# Patient Record
Sex: MALE | Race: WHITE | URBAN - METROPOLITAN AREA
[De-identification: names, ages, dates, MRNs, and addresses within clinical notes are randomized per-mention and may not be internally consistent; named-entity substitution may affect disease eponyms.]

---

## 2017-02-23 ENCOUNTER — TELEPHONE (OUTPATIENT)
Dept: RHEUMATOLOGY | Facility: CLINIC | Age: 57
End: 2017-02-23

## 2017-02-23 RX ORDER — ADALIMUMAB 40MG/0.8ML
KIT SUBCUTANEOUS
Qty: 2 EACH | Refills: 1 | Status: SHIPPED | OUTPATIENT
Start: 2017-02-23 | End: 2017-08-10

## 2017-02-23 NOTE — TELEPHONE ENCOUNTER
LOV 11/18/16  Future Appointments  Date Time Provider Sharif Mai   2/5/5419 4:10 AM Susana Palmer MD [facility] Jari Cooks

## 2017-05-02 ENCOUNTER — TELEPHONE (OUTPATIENT)
Dept: RHEUMATOLOGY | Facility: CLINIC | Age: 57
End: 2017-05-02

## 2017-05-02 RX ORDER — ADALIMUMAB 40MG/0.8ML
KIT SUBCUTANEOUS
Qty: 2 EACH | Refills: 1 | OUTPATIENT
Start: 2017-05-02

## 2017-06-20 ENCOUNTER — PATIENT OUTREACH (OUTPATIENT)
Dept: FAMILY MEDICINE CLINIC | Facility: CLINIC | Age: 57
End: 2017-06-20

## 2017-08-03 ENCOUNTER — TELEPHONE (OUTPATIENT)
Dept: RHEUMATOLOGY | Facility: CLINIC | Age: 57
End: 2017-08-03

## 2017-08-03 DIAGNOSIS — L40.50 PSORIATIC ARTHRITIS (HCC): Primary | ICD-10-CM

## 2017-08-08 LAB
ABSOLUTE BASOPHILS: 32 CELLS/UL (ref 0–200)
ABSOLUTE EOSINOPHILS: 128 CELLS/UL (ref 15–500)
ABSOLUTE LYMPHOCYTES: 2502 CELLS/UL (ref 850–3900)
ABSOLUTE MONOCYTES: 538 CELLS/UL (ref 200–950)
ABSOLUTE NEUTROPHILS: 3200 CELLS/UL (ref 1500–7800)
ALBUMIN/GLOBULIN RATIO: 1.6 (CALC) (ref 1–2.5)
ALBUMIN: 4.3 G/DL (ref 3.6–5.1)
ALKALINE PHOSPHATASE: 53 U/L (ref 40–115)
ALT: 20 U/L (ref 9–46)
AST: 16 U/L (ref 10–35)
BASOPHILS: 0.5 %
BILIRUBIN, TOTAL: 1.3 MG/DL (ref 0.2–1.2)
BUN: 14 MG/DL (ref 7–25)
CALCIUM: 9.1 MG/DL (ref 8.6–10.3)
CARBON DIOXIDE: 26 MMOL/L (ref 20–31)
CHLORIDE: 102 MMOL/L (ref 98–110)
CREATININE: 1.08 MG/DL (ref 0.7–1.33)
EGFR IF AFRICN AM: 87 ML/MIN/1.73M2
EGFR IF NONAFRICN AM: 75 ML/MIN/1.73M2
EOSINOPHILS: 2 %
GLOBULIN: 2.7 G/DL (CALC) (ref 1.9–3.7)
GLUCOSE: 168 MG/DL (ref 65–99)
HEMATOCRIT: 47.7 % (ref 38.5–50)
HEMOGLOBIN: 17 G/DL (ref 13.2–17.1)
LYMPHOCYTES: 39.1 %
MCH: 32.7 PG (ref 27–33)
MCHC: 35.6 G/DL (ref 32–36)
MCV: 91.7 FL (ref 80–100)
MONOCYTES: 8.4 %
MPV: 9.4 FL (ref 7.5–12.5)
NEUTROPHILS: 50 %
PLATELET COUNT: 179 THOUSAND/UL (ref 140–400)
POTASSIUM: 4.3 MMOL/L (ref 3.5–5.3)
PROTEIN, TOTAL: 7 G/DL (ref 6.1–8.1)
RDW: 12.5 % (ref 11–15)
RED BLOOD CELL COUNT: 5.2 MILLION/UL (ref 4.2–5.8)
SED RATE BY MODIFIED$WESTERGREN: 2 MM/H
SODIUM: 137 MMOL/L (ref 135–146)
WHITE BLOOD CELL COUNT: 6.4 THOUSAND/UL (ref 3.8–10.8)

## 2017-08-09 LAB
MITOGEN-NIL: 7.22 IU/ML
NIL: 0.02 IU/ML
QUANTIFERON(R)-TB GOLD: NEGATIVE
TB-NIL: 0.01 IU/ML

## 2017-08-10 ENCOUNTER — OFFICE VISIT (OUTPATIENT)
Dept: RHEUMATOLOGY | Facility: CLINIC | Age: 57
End: 2017-08-10

## 2017-08-10 VITALS
SYSTOLIC BLOOD PRESSURE: 138 MMHG | BODY MASS INDEX: 30.31 KG/M2 | DIASTOLIC BLOOD PRESSURE: 90 MMHG | WEIGHT: 200 LBS | HEIGHT: 68 IN | HEART RATE: 76 BPM | RESPIRATION RATE: 16 BRPM

## 2017-08-10 DIAGNOSIS — L40.50 PSORIATIC ARTHRITIS (HCC): Primary | ICD-10-CM

## 2017-08-10 PROCEDURE — 99214 OFFICE O/P EST MOD 30 MIN: CPT | Performed by: INTERNAL MEDICINE

## 2017-08-10 RX ORDER — FOLIC ACID 1 MG/1
1 TABLET ORAL DAILY
Qty: 90 TABLET | Refills: 3 | Status: SHIPPED | OUTPATIENT
Start: 2017-08-10 | End: 2018-03-12

## 2017-08-10 RX ORDER — ADALIMUMAB 40MG/0.8ML
KIT SUBCUTANEOUS
Qty: 6 EACH | Refills: 2 | Status: SHIPPED | OUTPATIENT
Start: 2017-08-10 | End: 2018-03-12

## 2017-08-10 NOTE — PROGRESS NOTES
[facility]  Dr. Khari Luis Progress Note     Subjective:   Cheryl Castillo is a(n) 59year old male. Current complaints: Patient presents with:  Psoriatic Arthritis: LOV 11/18/16. LABS 8/7/17-sed rate-2. TB test negative. Pt states psoriasis \"not bad\".

## 2017-08-10 NOTE — PATIENT INSTRUCTIONS
Current advice is to continue on yearly Humira 40 mg subcutaneous injection once every 2 weeks. This along with the methotrexate should take care of the psoriatic arthritis. Methotrexate is taken 3 tablets per week, along with folic acid 1 mg a day.   For

## 2017-08-14 ENCOUNTER — TELEPHONE (OUTPATIENT)
Dept: RHEUMATOLOGY | Facility: CLINIC | Age: 57
End: 2017-08-14

## 2017-12-05 NOTE — TELEPHONE ENCOUNTER
LOV 8/10/17  Future Appointments  Date Time Provider Sharif Mai   19/43/2190 05:69 AM Guevara Valiente MD [facility] Annmarie Bowens

## 2017-12-11 ENCOUNTER — OFFICE VISIT (OUTPATIENT)
Dept: RHEUMATOLOGY | Facility: CLINIC | Age: 57
End: 2017-12-11

## 2017-12-11 VITALS
WEIGHT: 199 LBS | SYSTOLIC BLOOD PRESSURE: 146 MMHG | BODY MASS INDEX: 30.16 KG/M2 | RESPIRATION RATE: 16 BRPM | HEIGHT: 68 IN | DIASTOLIC BLOOD PRESSURE: 82 MMHG | HEART RATE: 80 BPM

## 2017-12-11 DIAGNOSIS — R73.01 IFG (IMPAIRED FASTING GLUCOSE): ICD-10-CM

## 2017-12-11 DIAGNOSIS — L40.50 PSORIATIC ARTHRITIS (HCC): Primary | ICD-10-CM

## 2017-12-11 PROCEDURE — 99214 OFFICE O/P EST MOD 30 MIN: CPT | Performed by: INTERNAL MEDICINE

## 2017-12-11 NOTE — PATIENT INSTRUCTIONS
Due to your pain in your right shoulder after lifting items with her son-in-law I recommend he see orthopedic surgeon. Regarding his psoriatic arthritis continue Humira 40 mg subcu every 2 weeks.   Continue methotrexate 3 tablets per week, continue folic a

## 2017-12-11 NOTE — PROGRESS NOTES
[facility]  Dr. Claudio Alvarado Progress Note     Subjective:   Marbella Gonzalez is a(n) 59year old male. Current complaints: Patient presents with:  Psoriatic Arthritis: 3 month f/u.  Pt continues with right shoulder pain-since moved furniture last month has n/ Gloria Alvares MD 40/07/2017 10:13 AM

## 2018-03-09 LAB
ABSOLUTE BASOPHILS: 41 CELLS/UL (ref 0–200)
ABSOLUTE EOSINOPHILS: 151 CELLS/UL (ref 15–500)
ABSOLUTE LYMPHOCYTES: 1914 CELLS/UL (ref 850–3900)
ABSOLUTE MONOCYTES: 719 CELLS/UL (ref 200–950)
ABSOLUTE NEUTROPHILS: 2975 CELLS/UL (ref 1500–7800)
ALBUMIN/GLOBULIN RATIO: 1.7 (CALC) (ref 1–2.5)
ALBUMIN: 4.3 G/DL (ref 3.6–5.1)
ALKALINE PHOSPHATASE: 67 U/L (ref 40–115)
ALT: 20 U/L (ref 9–46)
AST: 16 U/L (ref 10–35)
BASOPHILS: 0.7 %
BILIRUBIN, TOTAL: 1.1 MG/DL (ref 0.2–1.2)
BUN: 15 MG/DL (ref 7–25)
CALCIUM: 9.1 MG/DL (ref 8.6–10.3)
CARBON DIOXIDE: 22 MMOL/L (ref 20–31)
CHLORIDE: 100 MMOL/L (ref 98–110)
CREATININE: 1.01 MG/DL (ref 0.7–1.25)
EGFR IF AFRICN AM: 93 ML/MIN/1.73M2
EGFR IF NONAFRICN AM: 80 ML/MIN/1.73M2
EOSINOPHILS: 2.6 %
GLOBULIN: 2.6 G/DL (CALC) (ref 1.9–3.7)
GLUCOSE: 287 MG/DL (ref 65–99)
HEMATOCRIT: 48.3 % (ref 38.5–50)
HEMOGLOBIN: 17 G/DL (ref 13.2–17.1)
LYMPHOCYTES: 33 %
MCH: 32.8 PG (ref 27–33)
MCHC: 35.2 G/DL (ref 32–36)
MCV: 93.2 FL (ref 80–100)
MONOCYTES: 12.4 %
MPV: 9.6 FL (ref 7.5–12.5)
NEUTROPHILS: 51.3 %
PLATELET COUNT: 169 THOUSAND/UL (ref 140–400)
POTASSIUM: 4.2 MMOL/L (ref 3.5–5.3)
PROTEIN, TOTAL: 6.9 G/DL (ref 6.1–8.1)
RDW: 12 % (ref 11–15)
RED BLOOD CELL COUNT: 5.18 MILLION/UL (ref 4.2–5.8)
SED RATE BY MODIFIED$WESTERGREN: 6 MM/H
SODIUM: 134 MMOL/L (ref 135–146)
WHITE BLOOD CELL COUNT: 5.8 THOUSAND/UL (ref 3.8–10.8)

## 2018-03-12 ENCOUNTER — OFFICE VISIT (OUTPATIENT)
Dept: RHEUMATOLOGY | Facility: CLINIC | Age: 58
End: 2018-03-12

## 2018-03-12 VITALS
BODY MASS INDEX: 29.7 KG/M2 | WEIGHT: 196 LBS | SYSTOLIC BLOOD PRESSURE: 126 MMHG | HEIGHT: 68 IN | DIASTOLIC BLOOD PRESSURE: 80 MMHG | RESPIRATION RATE: 16 BRPM | HEART RATE: 78 BPM

## 2018-03-12 DIAGNOSIS — L40.50 PSORIATIC ARTHRITIS (HCC): Primary | ICD-10-CM

## 2018-03-12 PROCEDURE — 99214 OFFICE O/P EST MOD 30 MIN: CPT | Performed by: INTERNAL MEDICINE

## 2018-03-12 RX ORDER — FOLIC ACID 1 MG/1
1 TABLET ORAL DAILY
Qty: 90 TABLET | Refills: 3 | Status: SHIPPED | OUTPATIENT
Start: 2018-03-12

## 2018-03-12 RX ORDER — ADALIMUMAB 40MG/0.8ML
KIT SUBCUTANEOUS
Qty: 6 EACH | Refills: 3 | Status: SHIPPED | OUTPATIENT
Start: 2018-03-12 | End: 2019-06-18

## 2018-03-12 NOTE — PATIENT INSTRUCTIONS
Continue Humira 40 mg sq every 2 weeks. Use Folic acd 1 mg per week. Use Methotrexate 3 per week  Use ES Tylenol prn. Return to office 4 months with labs.

## 2018-03-12 NOTE — PROGRESS NOTES
[facility]  Dr. Maryanne Lopez Progress Note     Subjective:   Dena Muhammad is a(n) 59year old male. Current complaints: Patient presents with:  Rheumatoid Arthritis: 3 month f/u. Labs 3/8/18-sed rate-6. Pt states no psoriasis or joint pain.  Taking Humira

## 2018-07-12 ENCOUNTER — OFFICE VISIT (OUTPATIENT)
Dept: RHEUMATOLOGY | Facility: CLINIC | Age: 58
End: 2018-07-12

## 2018-07-12 VITALS
HEART RATE: 80 BPM | RESPIRATION RATE: 18 BRPM | BODY MASS INDEX: 29.86 KG/M2 | SYSTOLIC BLOOD PRESSURE: 120 MMHG | WEIGHT: 197 LBS | DIASTOLIC BLOOD PRESSURE: 88 MMHG | HEIGHT: 68 IN

## 2018-07-12 DIAGNOSIS — L40.50 PSORIATIC ARTHRITIS (HCC): Primary | ICD-10-CM

## 2018-07-12 LAB
ABSOLUTE BASOPHILS: 29 CELLS/UL (ref 0–200)
ABSOLUTE EOSINOPHILS: 249 CELLS/UL (ref 15–500)
ABSOLUTE LYMPHOCYTES: 2152 CELLS/UL (ref 850–3900)
ABSOLUTE MONOCYTES: 592 CELLS/UL (ref 200–950)
ABSOLUTE NEUTROPHILS: 2778 CELLS/UL (ref 1500–7800)
ALBUMIN/GLOBULIN RATIO: 1.5 (CALC) (ref 1–2.5)
ALBUMIN: 4 G/DL (ref 3.6–5.1)
ALKALINE PHOSPHATASE: 52 U/L (ref 40–115)
ALT: 16 U/L (ref 9–46)
AST: 14 U/L (ref 10–35)
BASOPHILS: 0.5 %
BILIRUBIN, TOTAL: 1 MG/DL (ref 0.2–1.2)
BUN: 16 MG/DL (ref 7–25)
CALCIUM: 8.8 MG/DL (ref 8.6–10.3)
CARBON DIOXIDE: 25 MMOL/L (ref 20–31)
CHLORIDE: 102 MMOL/L (ref 98–110)
CREATININE: 1.08 MG/DL (ref 0.7–1.25)
EGFR IF AFRICN AM: 86 ML/MIN/1.73M2
EGFR IF NONAFRICN AM: 74 ML/MIN/1.73M2
EOSINOPHILS: 4.3 %
GLOBULIN: 2.7 G/DL (CALC) (ref 1.9–3.7)
GLUCOSE: 155 MG/DL (ref 65–139)
HEMATOCRIT: 47.2 % (ref 38.5–50)
HEMOGLOBIN: 16.4 G/DL (ref 13.2–17.1)
LYMPHOCYTES: 37.1 %
MCH: 32.3 PG (ref 27–33)
MCHC: 34.7 G/DL (ref 32–36)
MCV: 93.1 FL (ref 80–100)
MONOCYTES: 10.2 %
MPV: 9.6 FL (ref 7.5–12.5)
NEUTROPHILS: 47.9 %
PLATELET COUNT: 176 THOUSAND/UL (ref 140–400)
POTASSIUM: 4.3 MMOL/L (ref 3.5–5.3)
PROTEIN, TOTAL: 6.7 G/DL (ref 6.1–8.1)
RDW: 13.1 % (ref 11–15)
RED BLOOD CELL COUNT: 5.07 MILLION/UL (ref 4.2–5.8)
SED RATE BY MODIFIED$WESTERGREN: 2 MM/H
SODIUM: 136 MMOL/L (ref 135–146)
WHITE BLOOD CELL COUNT: 5.8 THOUSAND/UL (ref 3.8–10.8)

## 2018-07-12 PROCEDURE — 99214 OFFICE O/P EST MOD 30 MIN: CPT | Performed by: INTERNAL MEDICINE

## 2018-07-12 NOTE — PROGRESS NOTES
[facility]  Dr. Katelynn Allen Progress Note     Subjective:   Raji Yoon is a(n) 59year old male.    Current complaints: Patient presents with:  Psoriatic Arthritis: 4mo f/u-labs 7/11/18 SED rate 2, RAPID 3 -  mild ,pt c/o swelling in the hands, but feelin

## 2018-07-12 NOTE — PATIENT INSTRUCTIONS
Continue Humira 40 mg every 2 weeks. Use Methotrexate 3 per week. Use Folic acid 1 mg per day. Use ES Tylenol as needed. Return to office 4 months.

## 2018-08-16 ENCOUNTER — TELEPHONE (OUTPATIENT)
Dept: RHEUMATOLOGY | Facility: CLINIC | Age: 58
End: 2018-08-16

## 2018-11-07 ENCOUNTER — OFFICE VISIT (OUTPATIENT)
Dept: RHEUMATOLOGY | Facility: CLINIC | Age: 58
End: 2018-11-07
Payer: COMMERCIAL

## 2018-11-07 VITALS
RESPIRATION RATE: 20 BRPM | BODY MASS INDEX: 29.86 KG/M2 | WEIGHT: 197 LBS | DIASTOLIC BLOOD PRESSURE: 80 MMHG | SYSTOLIC BLOOD PRESSURE: 132 MMHG | HEIGHT: 68 IN | HEART RATE: 78 BPM

## 2018-11-07 DIAGNOSIS — L40.50 PSORIATIC ARTHRITIS (HCC): Primary | ICD-10-CM

## 2018-11-07 DIAGNOSIS — N40.0 BENIGN NON-NODULAR PROSTATIC HYPERPLASIA WITHOUT LOWER URINARY TRACT SYMPTOMS: ICD-10-CM

## 2018-11-07 PROCEDURE — 99214 OFFICE O/P EST MOD 30 MIN: CPT | Performed by: INTERNAL MEDICINE

## 2018-11-07 RX ORDER — CELECOXIB 200 MG/1
200 CAPSULE ORAL 2 TIMES DAILY
Qty: 60 CAPSULE | Refills: 3 | Status: SHIPPED | OUTPATIENT
Start: 2018-11-07 | End: 2019-03-07

## 2018-11-07 NOTE — PATIENT INSTRUCTIONS
Plan of action continue on Humira 40 mg every 2 weeks. Use methotrexate 3 tablets/week along with folic acid 1 mg/day. If pain occurs you can use extra strength Tylenol 500 mg 2 tablets as needed, or if that does not work use Celebrex 200 mg daily.   Retu

## 2019-03-07 ENCOUNTER — OFFICE VISIT (OUTPATIENT)
Dept: RHEUMATOLOGY | Facility: CLINIC | Age: 59
End: 2019-03-07
Payer: COMMERCIAL

## 2019-03-07 VITALS
DIASTOLIC BLOOD PRESSURE: 78 MMHG | RESPIRATION RATE: 16 BRPM | SYSTOLIC BLOOD PRESSURE: 138 MMHG | HEIGHT: 68 IN | BODY MASS INDEX: 28.64 KG/M2 | WEIGHT: 189 LBS | HEART RATE: 78 BPM

## 2019-03-07 DIAGNOSIS — L40.50 PSORIATIC ARTHRITIS (HCC): Primary | ICD-10-CM

## 2019-03-07 PROCEDURE — 99214 OFFICE O/P EST MOD 30 MIN: CPT | Performed by: INTERNAL MEDICINE

## 2019-03-07 NOTE — PROGRESS NOTES
[facility]  Dr. Ruth Ann Singh Progress Note     Subjective:   Wang Carter is a(n) 59year old male. Current complaints: Patient presents with:  Psoriatic Arthritis: 4 month f/u.  Pt getting labs in am.  Pt states missed one dose of Humira due to a cold, go

## 2019-03-07 NOTE — PATIENT INSTRUCTIONS
Use Humira 40 mg every 2 weeks. Use MTX 3 per week. Use Folic acid 1 mg per day. Use ibuprofen for pain as needed or tylenol. D/c Celebrex. Exercise regularly. Return to office in 4 months.

## 2019-03-09 LAB
ABSOLUTE BASOPHILS: 41 CELLS/UL (ref 0–200)
ABSOLUTE EOSINOPHILS: 180 CELLS/UL (ref 15–500)
ABSOLUTE LYMPHOCYTES: 1995 CELLS/UL (ref 850–3900)
ABSOLUTE MONOCYTES: 551 CELLS/UL (ref 200–950)
ABSOLUTE NEUTROPHILS: 3033 CELLS/UL (ref 1500–7800)
ALBUMIN/GLOBULIN RATIO: 1.8 (CALC) (ref 1–2.5)
ALBUMIN: 4.6 G/DL (ref 3.6–5.1)
ALKALINE PHOSPHATASE: 74 U/L (ref 40–115)
ALT: 15 U/L (ref 9–46)
AST: 12 U/L (ref 10–35)
BASOPHILS: 0.7 %
BILIRUBIN, TOTAL: 1.2 MG/DL (ref 0.2–1.2)
BUN: 18 MG/DL (ref 7–25)
CALCIUM: 9.6 MG/DL (ref 8.6–10.3)
CARBON DIOXIDE: 29 MMOL/L (ref 20–32)
CHLORIDE: 98 MMOL/L (ref 98–110)
CREATININE: 0.93 MG/DL (ref 0.7–1.25)
EGFR IF AFRICN AM: 103 ML/MIN/1.73M2
EGFR IF NONAFRICN AM: 89 ML/MIN/1.73M2
EOSINOPHILS: 3.1 %
GLOBULIN: 2.6 G/DL (CALC) (ref 1.9–3.7)
GLUCOSE: 311 MG/DL (ref 65–139)
HEMATOCRIT: 50.7 % (ref 38.5–50)
HEMOGLOBIN: 17.8 G/DL (ref 13.2–17.1)
LYMPHOCYTES: 34.4 %
MCH: 33.1 PG (ref 27–33)
MCHC: 35.1 G/DL (ref 32–36)
MCV: 94.4 FL (ref 80–100)
MONOCYTES: 9.5 %
MPV: 9.7 FL (ref 7.5–12.5)
NEUTROPHILS: 52.3 %
PLATELET COUNT: 169 THOUSAND/UL (ref 140–400)
POTASSIUM: 4.5 MMOL/L (ref 3.5–5.3)
PROTEIN, TOTAL: 7.2 G/DL (ref 6.1–8.1)
PSA, TOTAL: 1.6 NG/ML
RDW: 12.4 % (ref 11–15)
RED BLOOD CELL COUNT: 5.37 MILLION/UL (ref 4.2–5.8)
SED RATE BY MODIFIED$WESTERGREN: 11 MM/H
SODIUM: 134 MMOL/L (ref 135–146)
WHITE BLOOD CELL COUNT: 5.8 THOUSAND/UL (ref 3.8–10.8)

## 2019-03-12 ENCOUNTER — TELEPHONE (OUTPATIENT)
Dept: RHEUMATOLOGY | Facility: CLINIC | Age: 59
End: 2019-03-12

## 2019-03-12 NOTE — TELEPHONE ENCOUNTER
Patient tall called and given test results. Sed rate normal.  Blood count normal.  However random sugar was high at 311. He was told to watch his diet. His PSA was normal at 1.6. Dr. Ruth Ann Singh.

## 2019-04-02 NOTE — TELEPHONE ENCOUNTER
Your appointments     Date & Time Appointment Department [facility])    Jul 12, 2019  9:15 AM CDT Exam - Established Patient with Leongiselle Marcos, [address] ([address] Yohan Richardson)        [address]

## 2019-06-18 ENCOUNTER — TELEPHONE (OUTPATIENT)
Dept: RHEUMATOLOGY | Facility: CLINIC | Age: 59
End: 2019-06-18

## 2019-06-18 NOTE — TELEPHONE ENCOUNTER
LOV 3/7/19  Future Appointments   Date Time Provider Sharif Mai   7/16/7262  0:70 AM Sasha Yen MD [facility] Jewel Bolanos

## 2019-09-20 LAB
ABSOLUTE BASOPHILS: 39 CELLS/UL (ref 0–200)
ABSOLUTE EOSINOPHILS: 160 CELLS/UL (ref 15–500)
ABSOLUTE LYMPHOCYTES: 2349 CELLS/UL (ref 850–3900)
ABSOLUTE MONOCYTES: 490 CELLS/UL (ref 200–950)
ABSOLUTE NEUTROPHILS: 2464 CELLS/UL (ref 1500–7800)
ALBUMIN/GLOBULIN RATIO: 1.6 (CALC) (ref 1–2.5)
ALBUMIN: 4 G/DL (ref 3.6–5.1)
ALKALINE PHOSPHATASE: 58 U/L (ref 40–115)
ALT: 16 U/L (ref 9–46)
AST: 13 U/L (ref 10–35)
BASOPHILS: 0.7 %
BILIRUBIN, TOTAL: 1.3 MG/DL (ref 0.2–1.2)
BUN: 17 MG/DL (ref 7–25)
CALCIUM: 9.2 MG/DL (ref 8.6–10.3)
CARBON DIOXIDE: 30 MMOL/L (ref 20–32)
CHLORIDE: 96 MMOL/L (ref 98–110)
CREATININE: 1.01 MG/DL (ref 0.7–1.25)
EGFR IF AFRICN AM: 93 ML/MIN/1.73M2
EGFR IF NONAFRICN AM: 80 ML/MIN/1.73M2
EOSINOPHILS: 2.9 %
GLOBULIN: 2.5 G/DL (CALC) (ref 1.9–3.7)
GLUCOSE: 275 MG/DL (ref 65–139)
HEMATOCRIT: 48.6 % (ref 38.5–50)
HEMOGLOBIN: 16.9 G/DL (ref 13.2–17.1)
LYMPHOCYTES: 42.7 %
MCH: 33.1 PG (ref 27–33)
MCHC: 34.8 G/DL (ref 32–36)
MCV: 95.1 FL (ref 80–100)
MONOCYTES: 8.9 %
MPV: 9.5 FL (ref 7.5–12.5)
NEUTROPHILS: 44.8 %
PLATELET COUNT: 172 THOUSAND/UL (ref 140–400)
POTASSIUM: 4.2 MMOL/L (ref 3.5–5.3)
PROTEIN, TOTAL: 6.5 G/DL (ref 6.1–8.1)
RDW: 12.2 % (ref 11–15)
RED BLOOD CELL COUNT: 5.11 MILLION/UL (ref 4.2–5.8)
SED RATE BY MODIFIED$WESTERGREN: 2 MM/H
SODIUM: 133 MMOL/L (ref 135–146)
WHITE BLOOD CELL COUNT: 5.5 THOUSAND/UL (ref 3.8–10.8)

## 2019-09-23 ENCOUNTER — OFFICE VISIT (OUTPATIENT)
Dept: RHEUMATOLOGY | Facility: CLINIC | Age: 59
End: 2019-09-23
Payer: COMMERCIAL

## 2019-09-23 VITALS
BODY MASS INDEX: 27.28 KG/M2 | RESPIRATION RATE: 18 BRPM | WEIGHT: 180 LBS | HEART RATE: 86 BPM | DIASTOLIC BLOOD PRESSURE: 78 MMHG | SYSTOLIC BLOOD PRESSURE: 128 MMHG | HEIGHT: 68 IN

## 2019-09-23 DIAGNOSIS — E11.9 TYPE 2 DIABETES MELLITUS WITHOUT COMPLICATION, WITHOUT LONG-TERM CURRENT USE OF INSULIN (HCC): ICD-10-CM

## 2019-09-23 DIAGNOSIS — L40.50 PSORIATIC ARTHRITIS (HCC): Primary | ICD-10-CM

## 2019-09-23 PROCEDURE — 99214 OFFICE O/P EST MOD 30 MIN: CPT | Performed by: INTERNAL MEDICINE

## 2019-09-23 RX ORDER — ADALIMUMAB 40MG/0.4ML
1 KIT SUBCUTANEOUS
Refills: 2 | COMMUNITY
Start: 2019-08-29 | End: 2019-09-24

## 2019-09-23 NOTE — PATIENT INSTRUCTIONS
Advice continue on Humira 40 mg every 2 weeks for the psoriatic arthritis and control of psoriasis. In addition take methotrexate 3 tablets/week and folic acid 1 mg/day. Use of ibuprofen or Tylenol is as needed.   Continue to exercise regularly and watch

## 2019-09-23 NOTE — PROGRESS NOTES
[facility]  Dr. Aron Kyle Progress Note     Subjective:   Darryle Golder is a(n) 62year old male. Current complaints: Patient presents with:  Psoriatic Arthritis: 6 month f/u, labs 9/19/2019 glucose 275 fasting, will call   Feeling ok.   Joint pain  Not

## 2019-09-24 ENCOUNTER — OFFICE VISIT (OUTPATIENT)
Dept: INTERNAL MEDICINE CLINIC | Facility: CLINIC | Age: 59
End: 2019-09-24
Payer: COMMERCIAL

## 2019-09-24 VITALS
SYSTOLIC BLOOD PRESSURE: 126 MMHG | DIASTOLIC BLOOD PRESSURE: 76 MMHG | HEIGHT: 68.11 IN | HEART RATE: 84 BPM | BODY MASS INDEX: 28.34 KG/M2 | RESPIRATION RATE: 16 BRPM | TEMPERATURE: 99 F | WEIGHT: 187 LBS

## 2019-09-24 DIAGNOSIS — E78.5 DYSLIPIDEMIA: ICD-10-CM

## 2019-09-24 DIAGNOSIS — E11.9 TYPE 2 DIABETES MELLITUS WITHOUT COMPLICATION, WITHOUT LONG-TERM CURRENT USE OF INSULIN (HCC): Primary | ICD-10-CM

## 2019-09-24 DIAGNOSIS — E66.3 OVERWEIGHT (BMI 25.0-29.9): ICD-10-CM

## 2019-09-24 PROCEDURE — 99214 OFFICE O/P EST MOD 30 MIN: CPT | Performed by: INTERNAL MEDICINE

## 2019-09-24 RX ORDER — METFORMIN HYDROCHLORIDE 500 MG/1
500 TABLET, EXTENDED RELEASE ORAL 2 TIMES DAILY WITH MEALS
Qty: 180 TABLET | Refills: 0 | Status: SHIPPED | OUTPATIENT
Start: 2019-09-24 | End: 2019-10-04

## 2019-09-24 NOTE — PROGRESS NOTES
Daniela Church  3/9/1960    Patient presents with:  Establish Care  Blood Sugar: recent elevated blood sugar readings, H/O type 2      SUBJECTIVE   Daniela Church is a 62year old male with DM2 who presents to [facility].     The patient has a long-standing h pain   Past Medical History:   Diagnosis Date   • Benign non-nodular prostatic hyperplasia without lower urinary tract symptoms 8/28/2015   • Diabetes type 2, controlled ([facility] [address] 8/28/2015    cured 11.2015   • Obesity (BMI 30-39. 9) 8/28/2015   • Psoriatic art HbA1c    Dyslipidemia:  Fasting lipid panel ordered for baseline  Statin therapy will be initiated (per DM2)    Overweight:  Counseled regarding need for lifestyle changes to include proper diet and regular exercise    Patient was advised follow-up for CPE

## 2019-09-28 LAB
CHOL/HDLC RATIO: 4 (CALC)
CHOLESTEROL, TOTAL: 160 MG/DL
HDL CHOLESTEROL: 40 MG/DL
HEMOGLOBIN A1C: 10.8 % OF TOTAL HGB
LDL-CHOLESTEROL: 100 MG/DL (CALC)
NON-HDL CHOLESTEROL: 120 MG/DL (CALC)
TRIGLYCERIDES: 108 MG/DL

## 2019-09-30 ENCOUNTER — TELEPHONE (OUTPATIENT)
Dept: INTERNAL MEDICINE CLINIC | Facility: CLINIC | Age: 59
End: 2019-09-30

## 2019-09-30 NOTE — TELEPHONE ENCOUNTER
Metformin question on dosage    Pt also needs script sent for test strips for Contour Next meter. Pt only has one left.

## 2019-10-04 ENCOUNTER — OFFICE VISIT (OUTPATIENT)
Dept: INTERNAL MEDICINE CLINIC | Facility: CLINIC | Age: 59
End: 2019-10-04
Payer: COMMERCIAL

## 2019-10-04 VITALS
HEART RATE: 76 BPM | WEIGHT: 189 LBS | RESPIRATION RATE: 16 BRPM | SYSTOLIC BLOOD PRESSURE: 126 MMHG | BODY MASS INDEX: 28.64 KG/M2 | HEIGHT: 68.11 IN | TEMPERATURE: 98 F | DIASTOLIC BLOOD PRESSURE: 80 MMHG

## 2019-10-04 DIAGNOSIS — Z12.11 SCREENING FOR COLON CANCER: ICD-10-CM

## 2019-10-04 DIAGNOSIS — E78.5 DYSLIPIDEMIA: ICD-10-CM

## 2019-10-04 DIAGNOSIS — E08.65 DIABETES MELLITUS DUE TO UNDERLYING CONDITION, UNCONTROLLED, WITH HYPERGLYCEMIA (HCC): Primary | ICD-10-CM

## 2019-10-04 DIAGNOSIS — E66.3 OVERWEIGHT (BMI 25.0-29.9): ICD-10-CM

## 2019-10-04 PROCEDURE — 99214 OFFICE O/P EST MOD 30 MIN: CPT | Performed by: INTERNAL MEDICINE

## 2019-10-04 RX ORDER — ROSUVASTATIN CALCIUM 5 MG/1
5 TABLET, COATED ORAL NIGHTLY
Qty: 90 TABLET | Refills: 0 | Status: SHIPPED | OUTPATIENT
Start: 2019-10-04 | End: 2020-10-29

## 2019-10-04 RX ORDER — METFORMIN HYDROCHLORIDE 500 MG/1
TABLET, EXTENDED RELEASE ORAL
Qty: 180 TABLET | Refills: 0 | Status: SHIPPED | OUTPATIENT
Start: 2019-10-04 | End: 2019-10-15

## 2019-10-04 NOTE — PROGRESS NOTES
Jassi Portillo  3/9/1960    Patient presents with: Follow - Up: Labs 9/27  Vaccinations: defers Flu      Silvia Kendrick is a 62year old male who presents as a follow-up. The patient has a long-standing history of diabetes mellitus type 2.   Docenayoana Palmer mouth daily. Disp: 90 tablet Rfl: 3   Multiple Vitamins-Minerals (MULTIVITAL) Oral Chew Tab Chew 1 tablet by mouth daily.  Disp:  Rfl:         Celebrex [Celecoxib]    OTHER (SEE COMMENTS)    Comment:Leg pain   Past Medical History:   Diagnosis Date   • [facility] is a 62year old male who presents as a follow-up. The patient was advised to increase metformin extended release 500 mg twice daily, to 1 g in the morning and 500 mg at night.   Given the patient's significant improvement in blood glucose levels with an

## 2019-10-14 RX ORDER — METFORMIN HYDROCHLORIDE 500 MG/1
TABLET, EXTENDED RELEASE ORAL
Qty: 180 TABLET | Refills: 0 | OUTPATIENT
Start: 2019-10-14

## 2019-10-15 RX ORDER — METFORMIN HYDROCHLORIDE 500 MG/1
1000 TABLET, EXTENDED RELEASE ORAL 2 TIMES DAILY WITH MEALS
Qty: 360 TABLET | Refills: 0 | Status: SHIPPED | OUTPATIENT
Start: 2019-10-15 | End: 2020-01-16

## 2019-10-15 NOTE — TELEPHONE ENCOUNTER
Metformin 500 mg 2 tabs BID has been pended for approval.     Last Office Visit: 10-4-19 with AD for follow up   Last Rx Filled: 10-4-19 180 tabs with no refills   Last Labs: 9-27-19 hga1c/lipid  Future Appointment: 10-22-19    Per protocol to provider

## 2019-10-15 NOTE — TELEPHONE ENCOUNTER
Pt was advised after 2 weeks of taking this medication with 1 in the morning and 1 in the evening he was supposed to increase this medication to taking 2 in the morning and 2 in the evening.      Pt only has 1 pill left today and needs this medication refil

## 2019-10-17 ENCOUNTER — TELEPHONE (OUTPATIENT)
Dept: RHEUMATOLOGY | Facility: CLINIC | Age: 59
End: 2019-10-17

## 2019-12-06 ENCOUNTER — TELEPHONE (OUTPATIENT)
Dept: RHEUMATOLOGY | Facility: CLINIC | Age: 59
End: 2019-12-06

## 2019-12-06 RX ORDER — METHOTREXATE 2.5 MG/1
TABLET ORAL
Qty: 36 TABLET | Refills: 0 | OUTPATIENT
Start: 2019-12-06

## 2019-12-13 ENCOUNTER — OFFICE VISIT (OUTPATIENT)
Dept: INTERNAL MEDICINE CLINIC | Facility: CLINIC | Age: 59
End: 2019-12-13
Payer: COMMERCIAL

## 2019-12-13 VITALS
TEMPERATURE: 99 F | SYSTOLIC BLOOD PRESSURE: 122 MMHG | WEIGHT: 190 LBS | HEART RATE: 80 BPM | BODY MASS INDEX: 28.46 KG/M2 | RESPIRATION RATE: 16 BRPM | DIASTOLIC BLOOD PRESSURE: 84 MMHG | HEIGHT: 68.31 IN

## 2019-12-13 DIAGNOSIS — E78.5 DYSLIPIDEMIA: ICD-10-CM

## 2019-12-13 DIAGNOSIS — Z12.5 PROSTATE CANCER SCREENING: ICD-10-CM

## 2019-12-13 DIAGNOSIS — L40.50 PSORIATIC ARTHRITIS (HCC): ICD-10-CM

## 2019-12-13 DIAGNOSIS — Z23 FLU VACCINE NEED: ICD-10-CM

## 2019-12-13 DIAGNOSIS — Z00.00 ANNUAL PHYSICAL EXAM: Primary | ICD-10-CM

## 2019-12-13 DIAGNOSIS — E11.9 TYPE 2 DIABETES MELLITUS WITHOUT COMPLICATION, WITHOUT LONG-TERM CURRENT USE OF INSULIN (HCC): ICD-10-CM

## 2019-12-13 DIAGNOSIS — Z12.11 SCREENING FOR COLON CANCER: ICD-10-CM

## 2019-12-13 PROCEDURE — 90471 IMMUNIZATION ADMIN: CPT | Performed by: INTERNAL MEDICINE

## 2019-12-13 PROCEDURE — 90686 IIV4 VACC NO PRSV 0.5 ML IM: CPT | Performed by: INTERNAL MEDICINE

## 2019-12-13 PROCEDURE — 99396 PREV VISIT EST AGE 40-64: CPT | Performed by: INTERNAL MEDICINE

## 2019-12-13 NOTE — PROGRESS NOTES
Colleen Jarrett  3/9/1960    Patient presents with:  Wellness Visit: discuss colon cancer screening option  Vaccinations: requesting Flu      HPI:   Colleen Jarrett is a 62year old male who presents for an annual physical examination.     The patient has been in h Relation Age of Onset   • Diabetes Mother    • Stroke Mother    • Other (Parkinsin [Other]) Father       Social History    Tobacco Use      Smoking status: Never Smoker      Smokeless tobacco: Never Used    Alcohol use: No      Alcohol/week: 0.0 standard d to continue  Repeat lipid panel ordered     The patient indicates understanding of these issues and agrees to the plan.     TODAY'S ORDERS     Orders Placed This Encounter      PSA (Screening) [E]      Occult Blood, Fecal, Immunoassay [E]      Flulaval 6 mo

## 2019-12-23 RX ORDER — PERPHENAZINE 16 MG/1
TABLET, FILM COATED ORAL
Qty: 150 STRIP | Refills: 0 | OUTPATIENT
Start: 2019-12-23

## 2020-01-03 ENCOUNTER — TELEPHONE (OUTPATIENT)
Dept: INTERNAL MEDICINE CLINIC | Facility: CLINIC | Age: 60
End: 2020-01-03

## 2020-01-03 NOTE — TELEPHONE ENCOUNTER
LMTCB. Regarding sx. Informed normally do not prescribe rx over the phone, may need OV. WIC or UC available over the weekend. To call back at the office to further discuss, schedule OV, or if any further questions.

## 2020-01-03 NOTE — TELEPHONE ENCOUNTER
Pt called and stated that he has a sinus infection and with him being on Humira he would like an abx sent in for him     I informed the pt and that he would need to be seen in office but the pt declined stating he doesn't want to get anyone sick by coming

## 2020-01-03 NOTE — TELEPHONE ENCOUNTER
2nd attempt to contact. Lm for pt ([id number] Ana Lieberman per HIPAA) to inform f/u again before end of day. As indicated in previous voicemail may need OV. WIC or UC available over the weekend. To call back at the office if any further questions.   YANETHI

## 2020-01-04 NOTE — TELEPHONE ENCOUNTER
FYI: left message for patient on cell phone at 8:50 am. Recommended IC vs UC evaluation and callback to office with persistent symptoms.

## 2020-01-16 RX ORDER — METFORMIN HYDROCHLORIDE 500 MG/1
1000 TABLET, EXTENDED RELEASE ORAL 2 TIMES DAILY WITH MEALS
Qty: 360 TABLET | Refills: 1 | Status: SHIPPED | OUTPATIENT
Start: 2020-01-16 | End: 2020-08-06

## 2020-01-24 ENCOUNTER — OFFICE VISIT (OUTPATIENT)
Dept: INTERNAL MEDICINE CLINIC | Facility: CLINIC | Age: 60
End: 2020-01-24
Payer: COMMERCIAL

## 2020-01-24 VITALS
RESPIRATION RATE: 16 BRPM | HEIGHT: 68 IN | DIASTOLIC BLOOD PRESSURE: 68 MMHG | BODY MASS INDEX: 29.4 KG/M2 | WEIGHT: 194 LBS | HEART RATE: 88 BPM | TEMPERATURE: 99 F | OXYGEN SATURATION: 97 % | SYSTOLIC BLOOD PRESSURE: 122 MMHG

## 2020-01-24 DIAGNOSIS — K44.9 HIATAL HERNIA: ICD-10-CM

## 2020-01-24 DIAGNOSIS — R09.82 POSTNASAL DRIP: ICD-10-CM

## 2020-01-24 DIAGNOSIS — K21.9 GASTROESOPHAGEAL REFLUX DISEASE, ESOPHAGITIS PRESENCE NOT SPECIFIED: ICD-10-CM

## 2020-01-24 DIAGNOSIS — J32.0 CHRONIC MAXILLARY SINUSITIS: Primary | ICD-10-CM

## 2020-01-24 PROCEDURE — 99214 OFFICE O/P EST MOD 30 MIN: CPT | Performed by: INTERNAL MEDICINE

## 2020-01-24 RX ORDER — METHYLPREDNISOLONE 4 MG/1
TABLET ORAL
Qty: 1 KIT | Refills: 0 | Status: SHIPPED | OUTPATIENT
Start: 2020-01-24 | End: 2020-10-20 | Stop reason: ALTCHOICE

## 2020-01-24 NOTE — PROGRESS NOTES
Darryle Golder  3/9/1960    Patient presents with:  Chest Congestion: since 11/28, productive cough, post nasal drip, nasal congestion, denies fever/chills/aching      SUBJECTIVE   Darryle Golder is a 62year old male who presents with sinus congestion and chron Comment:Leg pain   Past Medical History:   Diagnosis Date   • Benign non-nodular prostatic hyperplasia without lower urinary tract symptoms 8/28/2015   • Diabetes type 2, controlled ([facility] [address] 8/28/2015    cured 11.2015   • Obesity (BMI 30-39. 9) 8/28/2015   • daily pepcid use advised  PND; intranasal glucocorticoids and saline  Chronic right maxillary sinusitis; refer to ENT service  Oral steroid therapy given duration and intensity of symptoms    The patient indicates understanding of these issues and agrees t

## 2020-02-10 ENCOUNTER — OFFICE VISIT (OUTPATIENT)
Dept: RHEUMATOLOGY | Facility: CLINIC | Age: 60
End: 2020-02-10
Payer: COMMERCIAL

## 2020-02-10 VITALS
HEART RATE: 88 BPM | WEIGHT: 193 LBS | RESPIRATION RATE: 18 BRPM | DIASTOLIC BLOOD PRESSURE: 70 MMHG | SYSTOLIC BLOOD PRESSURE: 126 MMHG | BODY MASS INDEX: 29.25 KG/M2 | HEIGHT: 68 IN

## 2020-02-10 DIAGNOSIS — L40.50 PSORIATIC ARTHRITIS (HCC): Primary | ICD-10-CM

## 2020-02-10 DIAGNOSIS — E11.9 TYPE 2 DIABETES MELLITUS WITHOUT COMPLICATION, WITHOUT LONG-TERM CURRENT USE OF INSULIN (HCC): ICD-10-CM

## 2020-02-10 PROCEDURE — 99214 OFFICE O/P EST MOD 30 MIN: CPT | Performed by: INTERNAL MEDICINE

## 2020-02-10 NOTE — PROGRESS NOTES
[facility]  Dr. Aron Kyle Progress Note     Subjective:   Darryle Golder is a(n) 62year old male. Current complaints: Patient presents with:  Psoriatic Arthritis: 5 month f/u, had the flu A&B since last visit, skin and joints good  Feeling not bad now.

## 2020-02-10 NOTE — PATIENT INSTRUCTIONS
Humira 40  mg every 2 weeks. MTX  3 tablets per week. Folic acid 1 mg/day. Return to see Dr. Khari Luis in 4 weeks with labs.

## 2020-03-02 DIAGNOSIS — E11.9 TYPE 2 DIABETES MELLITUS WITHOUT COMPLICATION, WITHOUT LONG-TERM CURRENT USE OF INSULIN (HCC): ICD-10-CM

## 2020-03-02 DIAGNOSIS — L40.50 PSORIATIC ARTHRITIS (HCC): ICD-10-CM

## 2020-03-02 RX ORDER — METHOTREXATE 2.5 MG/1
TABLET ORAL
Qty: 36 TABLET | Refills: 0 | OUTPATIENT
Start: 2020-03-02

## 2020-04-16 RX ORDER — BLOOD SUGAR DIAGNOSTIC
STRIP MISCELLANEOUS
Qty: 200 STRIP | Refills: 2 | Status: SHIPPED | OUTPATIENT
Start: 2020-04-16

## 2020-04-16 NOTE — TELEPHONE ENCOUNTER
Last VISIT 1/24/20 AD    Last REFILL 9/30/19 Contour Next Test 200E 2R    Last LABS 12/28/19 CMP, Lipid, HgA1c, Microalb/Creat    Future Appointments   Date Time Provider Sharif Mai   7/38/9266 66:79 AM Sasha Yen MD [facility] Jewel Bolanos

## 2020-06-12 ENCOUNTER — TELEMEDICINE (OUTPATIENT)
Dept: RHEUMATOLOGY | Facility: CLINIC | Age: 60
End: 2020-06-12

## 2020-06-12 DIAGNOSIS — L40.50 PSORIATIC ARTHRITIS (HCC): Primary | ICD-10-CM

## 2020-06-12 DIAGNOSIS — E11.9 TYPE 2 DIABETES MELLITUS WITHOUT COMPLICATION, WITHOUT LONG-TERM CURRENT USE OF INSULIN (HCC): ICD-10-CM

## 2020-06-12 DIAGNOSIS — L40.9 PSORIASIS: ICD-10-CM

## 2020-06-12 PROCEDURE — 99214 OFFICE O/P EST MOD 30 MIN: CPT | Performed by: INTERNAL MEDICINE

## 2020-06-12 NOTE — PATIENT INSTRUCTIONS
Regarding psoriatic arthritis stay on Humira 40 mg subcu every 2 weeks and methotrexate 3 tablets/week to control his psoriatic arthritis. Continue folic acid 1 mg/day. Follow your diabetic diet. Exercise regularly.   Use over-the-counter ibuprofen as ne

## 2020-06-12 NOTE — PROGRESS NOTES
[facility]  Dr. Gregoria Torres Progress Note     Subjective:   Rosi Hill is a(n) 56year old male. Current complaints: No major complaints. Doing good. Working from home. No major joint pain. Skin is good. No obvious psoriasis. No joint swelling.

## 2020-08-06 RX ORDER — METFORMIN HYDROCHLORIDE 500 MG/1
TABLET, EXTENDED RELEASE ORAL
Qty: 360 TABLET | Refills: 1 | Status: SHIPPED | OUTPATIENT
Start: 2020-08-06 | End: 2021-05-10

## 2020-08-06 NOTE — TELEPHONE ENCOUNTER
Last OV 1.24.20 w/ AD (acute)  Last PE 12.13.19   Last REFILL 1.16.20 Metformin ER 500mg #360 1R  Last LABS 12.28.19 CMP, Lipid, HgA1c, Microalb     Future Appointments   Date Time Provider Sharif Mai   59/74/9152 06:88 PM Prema Ceja MD [facility]

## 2020-09-23 ENCOUNTER — TELEPHONE (OUTPATIENT)
Dept: RHEUMATOLOGY | Facility: CLINIC | Age: 60
End: 2020-09-23

## 2020-09-23 DIAGNOSIS — E11.9 TYPE 2 DIABETES MELLITUS WITHOUT COMPLICATION, WITHOUT LONG-TERM CURRENT USE OF INSULIN (HCC): ICD-10-CM

## 2020-09-23 DIAGNOSIS — L40.50 PSORIATIC ARTHRITIS (HCC): ICD-10-CM

## 2020-09-23 RX ORDER — ADALIMUMAB 40MG/0.4ML
40 KIT SUBCUTANEOUS
Qty: 2 EACH | Refills: 5 | Status: SHIPPED | OUTPATIENT
Start: 2020-09-23 | End: 2021-05-03

## 2020-09-23 NOTE — TELEPHONE ENCOUNTER
Future Appointments   Date Time Provider Sharif Mai   98/90/5992 25:37 PM Livia Deleon MD [facility] Kaykay Barboza

## 2020-10-20 ENCOUNTER — TELEMEDICINE (OUTPATIENT)
Dept: RHEUMATOLOGY | Facility: CLINIC | Age: 60
End: 2020-10-20
Payer: COMMERCIAL

## 2020-10-20 DIAGNOSIS — E11.9 TYPE 2 DIABETES MELLITUS WITHOUT COMPLICATION, WITHOUT LONG-TERM CURRENT USE OF INSULIN (HCC): ICD-10-CM

## 2020-10-20 DIAGNOSIS — L40.50 PSORIATIC ARTHRITIS (HCC): Primary | ICD-10-CM

## 2020-10-20 DIAGNOSIS — L40.9 PSORIASIS: ICD-10-CM

## 2020-10-20 PROCEDURE — 99213 OFFICE O/P EST LOW 20 MIN: CPT | Performed by: INTERNAL MEDICINE

## 2020-10-20 NOTE — PROGRESS NOTES
This visit is conducted using Telemedicine with live, interactive video and audio. Patient has been referred to the [facility] website at [email or web address] to review the yearly Consent to Treat document.     Patient understands and accepts financial res

## 2020-10-20 NOTE — PATIENT INSTRUCTIONS
Need to take Humira 40 mg every 2 weeks for treatment of psoriatic arthritis and psoriasis. Maintain methotrexate 3 tablets weekly which equals 7.5 mg..  Folic acid 1 mg/day. Metformin daily for diabetes. Low-fat low-cholesterol diet.   Ibuprofen over-th

## 2020-10-29 RX ORDER — ROSUVASTATIN CALCIUM 5 MG/1
5 TABLET, COATED ORAL NIGHTLY
Qty: 90 TABLET | Refills: 0 | Status: SHIPPED | OUTPATIENT
Start: 2020-10-29 | End: 2022-04-07

## 2020-10-29 NOTE — TELEPHONE ENCOUNTER
Last VISIT: 01/24/2020    Last REFILL: 10/04/2019 qty 90 tablets/ 0 refills    Last LABS:  10/20/2020 C-REACTIVE PROTEIN, CMP,CBC WITH DIFFERENTIAL    No future appointments. Per PROTOCOL? Passed but last refill was over a year ago. Please Approve or Deny.

## 2020-11-06 NOTE — TELEPHONE ENCOUNTER
Spoke to pt and he stated that he wants to wait until the covid \"calms down\"     He will call to schedule CPE when he feels safer leaving the house.  No immediate issues to be addressed at this time

## 2020-12-07 NOTE — PROGRESS NOTES
[facility]  Dr. Sorto Every Progress Note     Subjective:   Jovan Kaufman is a(n) 59year old male. Current complaints: Patient presents with:  Psoriatic Arthritis: 3 month f/u, labs 11/5 Sed Rate 2, TB neg, on Humira and MTX. Doing well.  Wants to discus - continue toprolol 25 and Entresto 49-51 mg BID, MAPs at goal  - euvolemic off diuretics  - continue ASA 81 mg daily  - continue coumadin for goal INR 2-2.5. - reduce Entresto to 24-26 mg BID to allow for improvements in MAPs. Continue toprolol 25 PO QD. MAP goal 70-80  - euvolemic off diuretics  - continue ASA 81 mg daily  - continue coumadin for goal INR 2-2.5.

## 2021-04-09 ENCOUNTER — TELEPHONE (OUTPATIENT)
Dept: INTERNAL MEDICINE CLINIC | Facility: CLINIC | Age: 61
End: 2021-04-09

## 2021-04-09 DIAGNOSIS — Z13.228 SCREENING FOR METABOLIC DISORDER: ICD-10-CM

## 2021-04-09 DIAGNOSIS — Z13.0 SCREENING FOR BLOOD DISEASE: ICD-10-CM

## 2021-04-09 DIAGNOSIS — Z13.29 SCREENING FOR THYROID DISORDER: ICD-10-CM

## 2021-04-09 DIAGNOSIS — Z13.220 SCREENING FOR LIPID DISORDERS: ICD-10-CM

## 2021-04-09 DIAGNOSIS — E11.9 TYPE 2 DIABETES MELLITUS WITHOUT COMPLICATION, WITHOUT LONG-TERM CURRENT USE OF INSULIN (HCC): ICD-10-CM

## 2021-04-09 DIAGNOSIS — Z00.00 ROUTINE GENERAL MEDICAL EXAMINATION AT A HEALTH CARE FACILITY: Primary | ICD-10-CM

## 2021-04-09 DIAGNOSIS — Z23 NEED FOR VACCINATION: ICD-10-CM

## 2021-04-09 DIAGNOSIS — Z12.5 SCREENING FOR MALIGNANT NEOPLASM OF PROSTATE: ICD-10-CM

## 2021-04-09 NOTE — TELEPHONE ENCOUNTER
Orders to [facility] Pt aware to get labs done no sooner than 2 weeks prior to the appt. Pt aware to fast.  No call back required.   Future Appointments   Date Time Provider Sharif Mai   5/27/2021  8:00 AM Noah Blanc MD [facility]

## 2021-05-03 ENCOUNTER — TELEPHONE (OUTPATIENT)
Dept: RHEUMATOLOGY | Facility: CLINIC | Age: 61
End: 2021-05-03

## 2021-05-03 DIAGNOSIS — E11.9 TYPE 2 DIABETES MELLITUS WITHOUT COMPLICATION, WITHOUT LONG-TERM CURRENT USE OF INSULIN (HCC): ICD-10-CM

## 2021-05-03 DIAGNOSIS — L40.50 PSORIATIC ARTHRITIS (HCC): ICD-10-CM

## 2021-05-03 RX ORDER — ADALIMUMAB 40MG/0.4ML
40 KIT SUBCUTANEOUS
Qty: 2 EACH | Refills: 5 | Status: SHIPPED | OUTPATIENT
Start: 2021-05-03 | End: 2021-05-05

## 2021-05-03 NOTE — TELEPHONE ENCOUNTER
Received via fax RF Humira 40mg/0.4ml Pen  From [place].     LOV 10- virtual  Future Appointments   Date Time Provider Sharif Mai   7/8/7715  1:34 AM Elza Zapien MD [facility] Rajni Bernal

## 2021-05-05 ENCOUNTER — TELEPHONE (OUTPATIENT)
Dept: RHEUMATOLOGY | Facility: CLINIC | Age: 61
End: 2021-05-05

## 2021-05-05 DIAGNOSIS — E11.9 TYPE 2 DIABETES MELLITUS WITHOUT COMPLICATION, WITHOUT LONG-TERM CURRENT USE OF INSULIN (HCC): ICD-10-CM

## 2021-05-05 DIAGNOSIS — L40.50 PSORIATIC ARTHRITIS (HCC): ICD-10-CM

## 2021-05-05 RX ORDER — ADALIMUMAB 40MG/0.4ML
40 KIT SUBCUTANEOUS
Qty: 2 EACH | Refills: 5 | Status: SHIPPED | OUTPATIENT
Start: 2021-05-05 | End: 2021-06-02

## 2021-05-10 RX ORDER — METFORMIN HYDROCHLORIDE 500 MG/1
1000 TABLET, EXTENDED RELEASE ORAL 2 TIMES DAILY WITH MEALS
Qty: 360 TABLET | Refills: 1 | Status: SHIPPED | OUTPATIENT
Start: 2021-05-10 | End: 2022-01-31

## 2021-05-10 NOTE — TELEPHONE ENCOUNTER
Last VISIT 1/24/20 AD GERD    Last REFILL 08/06/20 360T 1R    Last LABS 12/28/19 MA/Creat, HgA1c, Lipid, CMP    Future Appointments   Date Time Provider Sharif Mai   5/27/2021  8:00 AM Tiffanie Noyola MD [facility]   7/0/6519  2:00 AM Michelle

## 2021-05-11 ENCOUNTER — DOCUMENTATION ONLY (OUTPATIENT)
Dept: RHEUMATOLOGY | Facility: CLINIC | Age: 61
End: 2021-05-11

## 2021-05-11 NOTE — PROGRESS NOTES
Insurance information faxed to [facility] for Humira as requested  [phone]; confirmation received.

## 2021-05-12 ENCOUNTER — TELEPHONE (OUTPATIENT)
Dept: RHEUMATOLOGY | Facility: CLINIC | Age: 61
End: 2021-05-12

## 2021-05-12 DIAGNOSIS — L40.50 PSORIATIC ARTHRITIS (HCC): Primary | ICD-10-CM

## 2021-05-12 RX ORDER — ADALIMUMAB 40MG/0.8ML
40 KIT SUBCUTANEOUS
Qty: 2 EACH | Refills: 3 | Status: SHIPPED | OUTPATIENT
Start: 2021-05-12 | End: 2021-10-19

## 2021-06-03 ENCOUNTER — OFFICE VISIT (OUTPATIENT)
Dept: INTERNAL MEDICINE CLINIC | Facility: CLINIC | Age: 61
End: 2021-06-03
Payer: COMMERCIAL

## 2021-06-03 VITALS
WEIGHT: 200 LBS | RESPIRATION RATE: 16 BRPM | TEMPERATURE: 97 F | HEART RATE: 76 BPM | DIASTOLIC BLOOD PRESSURE: 84 MMHG | SYSTOLIC BLOOD PRESSURE: 128 MMHG | OXYGEN SATURATION: 97 % | HEIGHT: 69 IN | BODY MASS INDEX: 29.62 KG/M2

## 2021-06-03 DIAGNOSIS — E11.9 TYPE 2 DIABETES MELLITUS WITHOUT COMPLICATION, WITHOUT LONG-TERM CURRENT USE OF INSULIN (HCC): ICD-10-CM

## 2021-06-03 DIAGNOSIS — Z12.11 SCREEN FOR COLON CANCER: ICD-10-CM

## 2021-06-03 DIAGNOSIS — Z00.00 ROUTINE GENERAL MEDICAL EXAMINATION AT A HEALTH CARE FACILITY: Primary | ICD-10-CM

## 2021-06-03 DIAGNOSIS — K44.9 HIATAL HERNIA: ICD-10-CM

## 2021-06-03 DIAGNOSIS — E78.5 DYSLIPIDEMIA: ICD-10-CM

## 2021-06-03 DIAGNOSIS — L40.50 PSORIATIC ARTHRITIS (HCC): ICD-10-CM

## 2021-06-03 DIAGNOSIS — E80.4 GILBERT'S SYNDROME: ICD-10-CM

## 2021-06-03 PROCEDURE — 99396 PREV VISIT EST AGE 40-64: CPT | Performed by: INTERNAL MEDICINE

## 2021-06-03 PROCEDURE — 3074F SYST BP LT 130 MM HG: CPT | Performed by: INTERNAL MEDICINE

## 2021-06-03 PROCEDURE — 3008F BODY MASS INDEX DOCD: CPT | Performed by: INTERNAL MEDICINE

## 2021-06-03 PROCEDURE — 3079F DIAST BP 80-89 MM HG: CPT | Performed by: INTERNAL MEDICINE

## 2021-06-03 RX ORDER — LISINOPRIL 2.5 MG/1
2.5 TABLET ORAL DAILY
Qty: 90 TABLET | Refills: 1 | Status: SHIPPED | OUTPATIENT
Start: 2021-06-03

## 2021-06-03 NOTE — PROGRESS NOTES
Darryle Golder  3/9/1960    Patient presents with:  Physical: RG rm 7 Physical      HPI:   Darryle Golder is a 59year old male who presents for an annual physical examination. The patient has been in his usual state of health. He denies any acute concerns. • Psoriatic arthritis ([facility] 3/11/2013      Patient Active Problem List:     Psoriatic arthritis ([facility]     Obesity (BMI 30-39. 9)     Benign non-nodular prostatic hyperplasia without lower urinary tract symptoms     Type 2 diabetes mellitus without complic hemoglobin A1c of 6.4%, however this is above the patient's usual blood glucose control attributed to unfavorable changes in dietary and lifestyle management; he will make efforts to improve while maintaining compliance with Metformin 1 g twice daily.   Rep

## 2021-06-21 ENCOUNTER — OFFICE VISIT (OUTPATIENT)
Dept: RHEUMATOLOGY | Facility: CLINIC | Age: 61
End: 2021-06-21
Payer: COMMERCIAL

## 2021-06-21 VITALS
RESPIRATION RATE: 16 BRPM | BODY MASS INDEX: 29.86 KG/M2 | HEART RATE: 80 BPM | WEIGHT: 197 LBS | SYSTOLIC BLOOD PRESSURE: 142 MMHG | TEMPERATURE: 98 F | DIASTOLIC BLOOD PRESSURE: 72 MMHG | HEIGHT: 68 IN

## 2021-06-21 DIAGNOSIS — L40.50 PSORIATIC ARTHRITIS (HCC): Primary | ICD-10-CM

## 2021-06-21 DIAGNOSIS — M21.611 BUNION OF GREAT TOE OF RIGHT FOOT: ICD-10-CM

## 2021-06-21 DIAGNOSIS — L40.9 PSORIASIS: ICD-10-CM

## 2021-06-21 PROCEDURE — 3008F BODY MASS INDEX DOCD: CPT | Performed by: INTERNAL MEDICINE

## 2021-06-21 PROCEDURE — 3077F SYST BP >= 140 MM HG: CPT | Performed by: INTERNAL MEDICINE

## 2021-06-21 PROCEDURE — 3078F DIAST BP <80 MM HG: CPT | Performed by: INTERNAL MEDICINE

## 2021-06-21 PROCEDURE — 99214 OFFICE O/P EST MOD 30 MIN: CPT | Performed by: INTERNAL MEDICINE

## 2021-06-21 PROCEDURE — 20600 DRAIN/INJ JOINT/BURSA W/O US: CPT | Performed by: INTERNAL MEDICINE

## 2021-06-21 RX ORDER — METHYLPREDNISOLONE ACETATE 40 MG/ML
10 INJECTION, SUSPENSION INTRA-ARTICULAR; INTRALESIONAL; INTRAMUSCULAR; SOFT TISSUE ONCE
Status: COMPLETED | OUTPATIENT
Start: 2021-06-21 | End: 2021-06-21

## 2021-06-21 RX ADMIN — METHYLPREDNISOLONE ACETATE 10 MG: 40 INJECTION, SUSPENSION INTRA-ARTICULAR; INTRALESIONAL; INTRAMUSCULAR; SOFT TISSUE at 18:20:00

## 2021-06-21 NOTE — PATIENT INSTRUCTIONS
For your back skin lesions recommend seeing dermatology - [facility] in [place]. Dr. Ramiro Stewart. The back has brown spots that appear to be benign age related changes call seborrheic keratosis.    Right big toe is developing a bunion - part age re

## 2021-06-21 NOTE — PROCEDURES
20600  Diagnosis right big toe bunion. Right big toe osteoarthritis. Procedure injection of right big toe joint first MTP. After obtaining consent for the patient, the right big toe was cleansed with Betadine and alcohol wipes.   Then the right big toe w

## 2021-06-21 NOTE — PROGRESS NOTES
[facility]  Dr. Michael Medrano Progress Note     Subjective:   Sloan Felty is a(n) 59year old male. Current complaints: Patient presents with:  Psoriatic Arthritis: previous video visit. Feeling a bit more achy.  Having a hard time bending big toe on right

## 2021-08-05 ENCOUNTER — TELEPHONE (OUTPATIENT)
Dept: RHEUMATOLOGY | Facility: CLINIC | Age: 61
End: 2021-08-05

## 2021-08-05 DIAGNOSIS — L40.50 PSORIATIC ARTHRITIS (HCC): ICD-10-CM

## 2021-08-05 DIAGNOSIS — E11.9 TYPE 2 DIABETES MELLITUS WITHOUT COMPLICATION, WITHOUT LONG-TERM CURRENT USE OF INSULIN (HCC): ICD-10-CM

## 2021-08-05 NOTE — TELEPHONE ENCOUNTER
LOV 6-21-21  RF Methotrexate 3 tabs/week  Future Appointments   Date Time Provider Sharif Mai   36/42/0983 74:06 AM Juancho Sotelo  W Laura Garsia

## 2021-10-19 ENCOUNTER — OFFICE VISIT (OUTPATIENT)
Dept: RHEUMATOLOGY | Facility: CLINIC | Age: 61
End: 2021-10-19
Payer: COMMERCIAL

## 2021-10-19 VITALS
DIASTOLIC BLOOD PRESSURE: 86 MMHG | OXYGEN SATURATION: 95 % | BODY MASS INDEX: 30.31 KG/M2 | TEMPERATURE: 97 F | HEIGHT: 68 IN | WEIGHT: 200 LBS | HEART RATE: 78 BPM | SYSTOLIC BLOOD PRESSURE: 138 MMHG

## 2021-10-19 DIAGNOSIS — L40.9 PSORIASIS: ICD-10-CM

## 2021-10-19 DIAGNOSIS — L40.50 PSORIATIC ARTHRITIS (HCC): Primary | ICD-10-CM

## 2021-10-19 DIAGNOSIS — E11.9 TYPE 2 DIABETES MELLITUS WITHOUT COMPLICATION, WITHOUT LONG-TERM CURRENT USE OF INSULIN (HCC): ICD-10-CM

## 2021-10-19 PROCEDURE — 3075F SYST BP GE 130 - 139MM HG: CPT | Performed by: INTERNAL MEDICINE

## 2021-10-19 PROCEDURE — 3079F DIAST BP 80-89 MM HG: CPT | Performed by: INTERNAL MEDICINE

## 2021-10-19 PROCEDURE — 3008F BODY MASS INDEX DOCD: CPT | Performed by: INTERNAL MEDICINE

## 2021-10-19 PROCEDURE — 99214 OFFICE O/P EST MOD 30 MIN: CPT | Performed by: INTERNAL MEDICINE

## 2021-10-19 RX ORDER — FOLIC ACID 1 MG/1
1 TABLET ORAL DAILY
Qty: 90 TABLET | Refills: 3 | Status: CANCELLED | OUTPATIENT
Start: 2021-10-19

## 2021-10-19 RX ORDER — ADALIMUMAB 40MG/0.8ML
40 KIT SUBCUTANEOUS
Qty: 2 EACH | Refills: 5 | Status: SHIPPED | OUTPATIENT
Start: 2021-10-19 | End: 2021-11-16

## 2021-10-19 NOTE — PATIENT INSTRUCTIONS
Continue Humira 40 mg every 2 weeks. Methotrexate is used 3 tablets/week which equals a dose of 7.5. Folic acid, vitamin 1 mg a day, which helps prevent side effects from methotrexate.   If you have breakthrough pain you can take either extra strength Tyl

## 2021-10-19 NOTE — PROGRESS NOTES
[facility]  Dr. Luisa Villalba Progress Note     Subjective:   Daniela Church is a(n) 59year old male. Current complaints: Patient presents with:   Follow - Up: LOV 6-21-21; complete PX with Dr Leocadia Mcburney recently labs at [address] [place] [address]; feeling pretty good, Right toe impr 10/19/2021 10:33 AM

## 2021-12-29 ENCOUNTER — TELEPHONE (OUTPATIENT)
Dept: INTERNAL MEDICINE CLINIC | Facility: CLINIC | Age: 61
End: 2021-12-29

## 2021-12-29 NOTE — TELEPHONE ENCOUNTER
LOV with AD 6/3/2021. Pt scheduled through [facility] for in office. Move to sooner VV?

## 2021-12-29 NOTE — TELEPHONE ENCOUNTER
Negative covid test on 12/28 but have bad cold cough head congestion , have had this going on 2 wks.       Future Appointments   Date Time Provider Sharif Pau   1/11/2022  4:40 PM Navid Jones MD [facility]     Should patient wait until 1/

## 2021-12-29 NOTE — TELEPHONE ENCOUNTER
Future Appointments   Date Time Provider Sharif Pau   12/30/2021 10:40 AM Alyssa Morrell MD [facility]   9/42/6728 51:64 PM Cristian Johnson MD [facility] Ros[facility] Burden

## 2021-12-30 ENCOUNTER — TELEMEDICINE (OUTPATIENT)
Dept: INTERNAL MEDICINE CLINIC | Facility: CLINIC | Age: 61
End: 2021-12-30
Payer: COMMERCIAL

## 2021-12-30 DIAGNOSIS — Z12.11 SCREEN FOR COLON CANCER: Primary | ICD-10-CM

## 2021-12-30 DIAGNOSIS — L40.50 PSORIATIC ARTHRITIS (HCC): Primary | ICD-10-CM

## 2021-12-30 PROCEDURE — 99213 OFFICE O/P EST LOW 20 MIN: CPT | Performed by: INTERNAL MEDICINE

## 2021-12-30 RX ORDER — AMOXICILLIN AND CLAVULANATE POTASSIUM 875; 125 MG/1; MG/1
1 TABLET, FILM COATED ORAL 2 TIMES DAILY
Qty: 20 TABLET | Refills: 0 | Status: SHIPPED | OUTPATIENT
Start: 2021-12-30 | End: 2022-01-09

## 2021-12-30 RX ORDER — BENZONATATE 100 MG/1
100 CAPSULE ORAL 3 TIMES DAILY PRN
Qty: 21 CAPSULE | Refills: 0 | Status: SHIPPED | OUTPATIENT
Start: 2021-12-30 | End: 2022-01-06

## 2021-12-30 RX ORDER — ADALIMUMAB 40MG/0.4ML
KIT SUBCUTANEOUS
Qty: 2 EACH | Refills: 3 | Status: SHIPPED | OUTPATIENT
Start: 2021-12-30

## 2021-12-30 RX ORDER — CODEINE PHOSPHATE AND GUAIFENESIN 10; 100 MG/5ML; MG/5ML
5 SOLUTION ORAL NIGHTLY PRN
Qty: 35 ML | Refills: 0 | Status: SHIPPED | OUTPATIENT
Start: 2021-12-30 | End: 2022-01-06

## 2021-12-30 NOTE — PROGRESS NOTES
Gerardo Uriarte  3/9/1960    No chief complaint on file. Video encounter    SUBJECTIVE   Gerardo Uriarte is a 59year old male who presents congestion. The patient describes a three-week duration of nasal and sinus congestion with green mucous.  He reports an a cured 11.2015   • Obesity (BMI 30-39. 9) 8/28/2015   • Psoriatic arthritis ([facility] 3/11/2013      Patient Active Problem List:     Psoriatic arthritis ([facility]     Obesity (BMI 30-39. 9)     Benign non-nodular prostatic hyperplasia without lower urinary tract

## 2021-12-30 NOTE — TELEPHONE ENCOUNTER
LOV 10-19-21  Future Appointments   Date Time Provider Sharif Mai   0/19/6216 20:78 PM Jewel Voss MD [facility]

## 2022-01-31 RX ORDER — METFORMIN HYDROCHLORIDE 500 MG/1
TABLET, EXTENDED RELEASE ORAL
Qty: 360 TABLET | Refills: 1 | Status: SHIPPED | OUTPATIENT
Start: 2022-01-31

## 2022-01-31 NOTE — TELEPHONE ENCOUNTER
Last OV 6/3/21  Last PE 6/3/21  Last REFILL   Medication Quantity Refills Start End   metFORMIN HCl  MG Oral Tablet 24 Hr 360 tablet 1 5/10/2021      Last LABS A1c, microalb, psa, tsh, lipid, cmp, cbc 5/18/21    Future Appointments   Date Time Provid

## 2022-03-02 RX ORDER — METFORMIN HYDROCHLORIDE 500 MG/1
TABLET, EXTENDED RELEASE ORAL
Qty: 360 TABLET | Refills: 1 | Status: SHIPPED | OUTPATIENT
Start: 2022-03-02

## 2022-03-02 NOTE — TELEPHONE ENCOUNTER
Last VISIT 06/03/21    Last CPE 06/03/21    Last REFILL 01/31/22 qty 180 w/1 refills    Last LABS 05/18/21 Multiple labs done      No future appointments. Per PROTOCOL? Failed     Please Approve or Deny.

## 2022-03-15 LAB
ABSOLUTE BASOPHILS: 43 CELLS/UL (ref 0–200)
ABSOLUTE EOSINOPHILS: 163 CELLS/UL (ref 15–500)
ABSOLUTE LYMPHOCYTES: 2357 CELLS/UL (ref 850–3900)
ABSOLUTE MONOCYTES: 618 CELLS/UL (ref 200–950)
ABSOLUTE NEUTROPHILS: 3919 CELLS/UL (ref 1500–7800)
ALBUMIN/GLOBULIN RATIO: 1.5 (CALC) (ref 1–2.5)
ALBUMIN: 4.3 G/DL (ref 3.6–5.1)
ALKALINE PHOSPHATASE: 47 U/L (ref 35–144)
ALT: 18 U/L (ref 9–46)
AST: 15 U/L (ref 10–35)
BASOPHILS: 0.6 %
BILIRUBIN, TOTAL: 1 MG/DL (ref 0.2–1.2)
BUN: 12 MG/DL (ref 7–25)
CALCIUM: 9.5 MG/DL (ref 8.6–10.3)
CARBON DIOXIDE: 29 MMOL/L (ref 20–32)
CHLORIDE: 98 MMOL/L (ref 98–110)
CREATININE: 1.02 MG/DL (ref 0.7–1.25)
EGFR IF AFRICN AM: 90 ML/MIN/1.73M2
EGFR IF NONAFRICN AM: 77 ML/MIN/1.73M2
EOSINOPHILS: 2.3 %
GLOBULIN: 2.8 G/DL (CALC) (ref 1.9–3.7)
GLUCOSE: 147 MG/DL (ref 65–99)
HEMATOCRIT: 45.5 % (ref 38.5–50)
HEMOGLOBIN: 15.8 G/DL (ref 13.2–17.1)
LYMPHOCYTES: 33.2 %
MCH: 33.2 PG (ref 27–33)
MCHC: 34.7 G/DL (ref 32–36)
MCV: 95.6 FL (ref 80–100)
MONOCYTES: 8.7 %
MPV: 9.1 FL (ref 7.5–12.5)
NEUTROPHILS: 55.2 %
PLATELET COUNT: 175 THOUSAND/UL (ref 140–400)
POTASSIUM: 4.4 MMOL/L (ref 3.5–5.3)
PROTEIN, TOTAL: 7.1 G/DL (ref 6.1–8.1)
RDW: 13.2 % (ref 11–15)
RED BLOOD CELL COUNT: 4.76 MILLION/UL (ref 4.2–5.8)
SED RATE BY MODIFIED$WESTERGREN: 2 MM/H
SODIUM: 136 MMOL/L (ref 135–146)
WHITE BLOOD CELL COUNT: 7.1 THOUSAND/UL (ref 3.8–10.8)

## 2022-03-23 RX ORDER — BLOOD SUGAR DIAGNOSTIC
STRIP MISCELLANEOUS 2 TIMES DAILY
Refills: 0 | Status: CANCELLED | OUTPATIENT
Start: 2022-03-23

## 2022-03-23 RX ORDER — BLOOD SUGAR DIAGNOSTIC
STRIP MISCELLANEOUS
Qty: 200 STRIP | Refills: 2 | Status: SHIPPED | OUTPATIENT
Start: 2022-03-23

## 2022-04-01 RX ORDER — LISINOPRIL 2.5 MG/1
TABLET ORAL
Qty: 90 TABLET | Refills: 1 | Status: SHIPPED | OUTPATIENT
Start: 2022-04-01

## 2022-04-01 NOTE — TELEPHONE ENCOUNTER
Last VISIT 06/03/21    Last CPE 06/03/21    Last REFILL 06/03/21 qty 90 w/1 refill    Last LABS 03/14/21 CBC, CMP done    No Future Appointments      Per PROTOCOL? Failed       Please Approve or Deny.

## 2022-04-05 ENCOUNTER — TELEPHONE (OUTPATIENT)
Dept: INTERNAL MEDICINE CLINIC | Facility: CLINIC | Age: 62
End: 2022-04-05

## 2022-04-05 NOTE — TELEPHONE ENCOUNTER
FIT ordered at CPE June 2021, not done. Please mail another card to pt and call him to remind him to do ASAP.

## 2022-04-07 RX ORDER — ROSUVASTATIN CALCIUM 5 MG/1
TABLET, COATED ORAL
Qty: 90 TABLET | Refills: 0 | Status: SHIPPED | OUTPATIENT
Start: 2022-04-07

## 2022-04-11 NOTE — TELEPHONE ENCOUNTER
CMA called to discuss w/ pt. Pt now states he would prefer to do Colonoscopy. Asking for referral to be placed. Pt also asking for PSA orders to be placed for mid May for him to complete.

## 2022-05-10 ENCOUNTER — OFFICE VISIT (OUTPATIENT)
Dept: RHEUMATOLOGY | Facility: CLINIC | Age: 62
End: 2022-05-10
Payer: COMMERCIAL

## 2022-05-10 VITALS
DIASTOLIC BLOOD PRESSURE: 68 MMHG | WEIGHT: 199 LBS | TEMPERATURE: 99 F | OXYGEN SATURATION: 95 % | SYSTOLIC BLOOD PRESSURE: 124 MMHG | HEIGHT: 68 IN | BODY MASS INDEX: 30.16 KG/M2 | HEART RATE: 62 BPM

## 2022-05-10 DIAGNOSIS — L40.9 PSORIASIS: ICD-10-CM

## 2022-05-10 DIAGNOSIS — L40.50 PSORIATIC ARTHRITIS (HCC): Primary | ICD-10-CM

## 2022-05-10 DIAGNOSIS — E11.9 TYPE 2 DIABETES MELLITUS WITHOUT COMPLICATION, WITHOUT LONG-TERM CURRENT USE OF INSULIN (HCC): ICD-10-CM

## 2022-05-10 DIAGNOSIS — M21.611 BUNION OF GREAT TOE OF RIGHT FOOT: ICD-10-CM

## 2022-05-10 PROCEDURE — 3008F BODY MASS INDEX DOCD: CPT | Performed by: INTERNAL MEDICINE

## 2022-05-10 PROCEDURE — 3078F DIAST BP <80 MM HG: CPT | Performed by: INTERNAL MEDICINE

## 2022-05-10 PROCEDURE — 99214 OFFICE O/P EST MOD 30 MIN: CPT | Performed by: INTERNAL MEDICINE

## 2022-05-10 PROCEDURE — 3074F SYST BP LT 130 MM HG: CPT | Performed by: INTERNAL MEDICINE

## 2022-05-10 PROCEDURE — 20600 DRAIN/INJ JOINT/BURSA W/O US: CPT | Performed by: INTERNAL MEDICINE

## 2022-05-10 RX ORDER — METHYLPREDNISOLONE ACETATE 40 MG/ML
10 INJECTION, SUSPENSION INTRA-ARTICULAR; INTRALESIONAL; INTRAMUSCULAR; SOFT TISSUE ONCE
Status: COMPLETED | OUTPATIENT
Start: 2022-05-10 | End: 2022-05-10

## 2022-05-10 RX ORDER — FOLIC ACID 1 MG/1
1 TABLET ORAL DAILY
Qty: 90 TABLET | Refills: 3 | Status: CANCELLED | OUTPATIENT
Start: 2022-05-10

## 2022-05-10 RX ADMIN — METHYLPREDNISOLONE ACETATE 10 MG: 40 INJECTION, SUSPENSION INTRA-ARTICULAR; INTRALESIONAL; INTRAMUSCULAR; SOFT TISSUE at 14:36:00

## 2022-05-10 NOTE — PROCEDURES
20600  Right big toe pain. Right big toe bunion. Injection of right big toe joint first MTP. After obtaining consent from the patient, the right  big toe was cleansed with Betadine alcohol wipes. Then the right big toe was injected at the first MTP joint with 10 mg of methylprednisolone and a fourth of a cc of 1% lidocaine. Patient tolerated the right big toe joint injection without incident.

## 2022-05-10 NOTE — PATIENT INSTRUCTIONS
Today the right big toe joint was injected with cortisone. On rest.  Hopefully this will give you some significant relief of pain in that toe joint. Psoriatic arthritis maintain your Humira at 40 mg every 2 weeks. Methotrexate is 3 tablets/week which equals 7.5 mg. Folic acid is 1 mg/day. Also it is advised to take ibuprofen 200 mg tablets, 1 or 2 at a time 3 times a day if needed for breakthrough pain. Current labs are stable. Return to office for recheck 4 months.

## 2022-06-13 ENCOUNTER — TELEPHONE (OUTPATIENT)
Dept: INTERNAL MEDICINE CLINIC | Facility: CLINIC | Age: 62
End: 2022-06-13

## 2022-06-13 ENCOUNTER — PATIENT MESSAGE (OUTPATIENT)
Dept: INTERNAL MEDICINE CLINIC | Facility: CLINIC | Age: 62
End: 2022-06-13

## 2022-06-13 DIAGNOSIS — Z13.0 SCREENING FOR BLOOD DISEASE: ICD-10-CM

## 2022-06-13 DIAGNOSIS — Z13.220 SCREENING FOR LIPID DISORDERS: ICD-10-CM

## 2022-06-13 DIAGNOSIS — Z13.228 SCREENING FOR METABOLIC DISORDER: ICD-10-CM

## 2022-06-13 DIAGNOSIS — E11.9 TYPE 2 DIABETES MELLITUS WITHOUT COMPLICATION, WITHOUT LONG-TERM CURRENT USE OF INSULIN (HCC): ICD-10-CM

## 2022-06-13 DIAGNOSIS — Z00.00 ROUTINE GENERAL MEDICAL EXAMINATION AT A HEALTH CARE FACILITY: Primary | ICD-10-CM

## 2022-06-13 DIAGNOSIS — Z13.29 SCREENING FOR THYROID DISORDER: ICD-10-CM

## 2022-06-13 NOTE — TELEPHONE ENCOUNTER
Future Appointments   Date Time Provider Sharif Mai   7/14/2022  8:00 AM Dragan Barboza MD [facility]     [facility] to [facility]- Pt informed that labs need to be completed no sooner than 2 weeks prior to the appt.  Pt aware to fast-no call back required

## 2022-06-13 NOTE — TELEPHONE ENCOUNTER
From: Dawood Castro  To: Pavel Staton MD  Sent: 6/13/2022 3:31 PM CDT  Subject: Refill     Hi Dr. Denise Green,    Can I please have 90 day refills for Rosuvastatin and Lisinopril sent to [facility], change of pharmacy for savings. By the way I have set up an Annual Physical in the few weeks and a colon screening later July.     Thank Zachary Brooks

## 2022-06-14 RX ORDER — LISINOPRIL 2.5 MG/1
2.5 TABLET ORAL DAILY
Qty: 90 TABLET | Refills: 0 | Status: SHIPPED | OUTPATIENT
Start: 2022-06-14

## 2022-06-14 RX ORDER — ROSUVASTATIN CALCIUM 5 MG/1
5 TABLET, COATED ORAL NIGHTLY
Qty: 90 TABLET | Refills: 0 | Status: SHIPPED | OUTPATIENT
Start: 2022-06-14

## 2022-06-14 NOTE — TELEPHONE ENCOUNTER
Last VISIT 06/03/21    Last CPE 06/03/21    Last REFILL 04/07/22 qty 90 w/0 refills    Last LABS 03/14/22 CBC, CMP done     Future Appointments   Date Time Provider Sharif Mai   7/14/2022  8:00 AM Nathan Bean MD [facility]         Per PROTOCOL? Failed    Please Approve or Deny.

## 2022-06-14 NOTE — TELEPHONE ENCOUNTER
Bloodwork orders placed to [address] [place] [address] lab for upcoming physical per protocol.

## 2022-07-11 PROCEDURE — 3044F HG A1C LEVEL LT 7.0%: CPT | Performed by: INTERNAL MEDICINE

## 2022-07-11 PROCEDURE — 3061F NEG MICROALBUMINURIA REV: CPT | Performed by: INTERNAL MEDICINE

## 2022-07-12 LAB
ABSOLUTE BASOPHILS: 29 CELLS/UL (ref 0–200)
ABSOLUTE EOSINOPHILS: 191 CELLS/UL (ref 15–500)
ABSOLUTE LYMPHOCYTES: 1926 CELLS/UL (ref 850–3900)
ABSOLUTE MONOCYTES: 617 CELLS/UL (ref 200–950)
ABSOLUTE NEUTROPHILS: 2136 CELLS/UL (ref 1500–7800)
ALBUMIN/GLOBULIN RATIO: 1.5 (CALC) (ref 1–2.5)
ALBUMIN: 4.1 G/DL (ref 3.6–5.1)
ALKALINE PHOSPHATASE: 38 U/L (ref 35–144)
ALT: 17 U/L (ref 9–46)
AST: 14 U/L (ref 10–35)
BASOPHILS: 0.6 %
BILIRUBIN, TOTAL: 1.4 MG/DL (ref 0.2–1.2)
BUN: 18 MG/DL (ref 7–25)
CALCIUM: 9.2 MG/DL (ref 8.6–10.3)
CARBON DIOXIDE: 28 MMOL/L (ref 20–32)
CHLORIDE: 101 MMOL/L (ref 98–110)
CHOL/HDLC RATIO: 4.5 (CALC)
CHOLESTEROL, TOTAL: 174 MG/DL
CREATININE, RANDOM URINE: 137 MG/DL (ref 20–320)
CREATININE: 1.09 MG/DL (ref 0.7–1.35)
EGFR: 76 ML/MIN/1.73M2
EOSINOPHILS: 3.9 %
GLOBULIN: 2.7 G/DL (CALC) (ref 1.9–3.7)
GLUCOSE: 122 MG/DL (ref 65–99)
HDL CHOLESTEROL: 39 MG/DL
HEMATOCRIT: 43.8 % (ref 38.5–50)
HEMOGLOBIN A1C: 6.3 % OF TOTAL HGB
HEMOGLOBIN: 15.1 G/DL (ref 13.2–17.1)
LDL-CHOLESTEROL: 115 MG/DL (CALC)
LYMPHOCYTES: 39.3 %
MCH: 32.9 PG (ref 27–33)
MCHC: 34.5 G/DL (ref 32–36)
MCV: 95.4 FL (ref 80–100)
MICROALBUMIN/CREATININE RATIO, RANDOM URINE: 5 MCG/MG CREAT
MICROALBUMIN: 0.7 MG/DL
MONOCYTES: 12.6 %
MPV: 9.5 FL (ref 7.5–12.5)
NEUTROPHILS: 43.6 %
NON-HDL CHOLESTEROL: 135 MG/DL (CALC)
PLATELET COUNT: 169 THOUSAND/UL (ref 140–400)
POTASSIUM: 4.2 MMOL/L (ref 3.5–5.3)
PROTEIN, TOTAL: 6.8 G/DL (ref 6.1–8.1)
RDW: 12.6 % (ref 11–15)
RED BLOOD CELL COUNT: 4.59 MILLION/UL (ref 4.2–5.8)
SODIUM: 136 MMOL/L (ref 135–146)
TRIGLYCERIDES: 94 MG/DL
TSH W/REFLEX TO FT4: 2.58 MIU/L (ref 0.4–4.5)
WHITE BLOOD CELL COUNT: 4.9 THOUSAND/UL (ref 3.8–10.8)

## 2022-07-14 ENCOUNTER — OFFICE VISIT (OUTPATIENT)
Dept: INTERNAL MEDICINE CLINIC | Facility: CLINIC | Age: 62
End: 2022-07-14
Payer: COMMERCIAL

## 2022-07-14 ENCOUNTER — HOSPITAL ENCOUNTER (OUTPATIENT)
Dept: GENERAL RADIOLOGY | Age: 62
Discharge: HOME | End: 2022-07-14
Attending: INTERNAL MEDICINE
Payer: COMMERCIAL

## 2022-07-14 VITALS
OXYGEN SATURATION: 98 % | HEIGHT: 68.7 IN | DIASTOLIC BLOOD PRESSURE: 78 MMHG | BODY MASS INDEX: 29.21 KG/M2 | RESPIRATION RATE: 16 BRPM | TEMPERATURE: 97 F | HEART RATE: 70 BPM | SYSTOLIC BLOOD PRESSURE: 124 MMHG | WEIGHT: 195 LBS

## 2022-07-14 DIAGNOSIS — E80.4 GILBERT'S SYNDROME: ICD-10-CM

## 2022-07-14 DIAGNOSIS — G89.29 CHRONIC FOOT PAIN, RIGHT: ICD-10-CM

## 2022-07-14 DIAGNOSIS — M79.671 CHRONIC FOOT PAIN, RIGHT: ICD-10-CM

## 2022-07-14 DIAGNOSIS — M21.611 BUNION OF GREAT TOE OF RIGHT FOOT: ICD-10-CM

## 2022-07-14 DIAGNOSIS — L40.50 PSORIATIC ARTHRITIS (HCC): ICD-10-CM

## 2022-07-14 DIAGNOSIS — Z00.00 ROUTINE GENERAL MEDICAL EXAMINATION AT A HEALTH CARE FACILITY: Primary | ICD-10-CM

## 2022-07-14 PROCEDURE — 73630 X-RAY EXAM OF FOOT: CPT | Performed by: INTERNAL MEDICINE

## 2022-07-14 PROCEDURE — 3008F BODY MASS INDEX DOCD: CPT | Performed by: INTERNAL MEDICINE

## 2022-07-14 PROCEDURE — 99396 PREV VISIT EST AGE 40-64: CPT | Performed by: INTERNAL MEDICINE

## 2022-07-14 PROCEDURE — 90471 IMMUNIZATION ADMIN: CPT | Performed by: INTERNAL MEDICINE

## 2022-07-14 PROCEDURE — 3078F DIAST BP <80 MM HG: CPT | Performed by: INTERNAL MEDICINE

## 2022-07-14 PROCEDURE — 3074F SYST BP LT 130 MM HG: CPT | Performed by: INTERNAL MEDICINE

## 2022-07-14 PROCEDURE — 90750 HZV VACC RECOMBINANT IM: CPT | Performed by: INTERNAL MEDICINE

## 2022-07-20 PROBLEM — Z12.11 SPECIAL SCREENING FOR MALIGNANT NEOPLASMS, COLON: Status: ACTIVE | Noted: 2022-07-20

## 2022-09-07 LAB
ABSOLUTE BASOPHILS: 32 CELLS/UL (ref 0–200)
ABSOLUTE EOSINOPHILS: 221 CELLS/UL (ref 15–500)
ABSOLUTE LYMPHOCYTES: 2035 CELLS/UL (ref 850–3900)
ABSOLUTE MONOCYTES: 504 CELLS/UL (ref 200–950)
ABSOLUTE NEUTROPHILS: 3509 CELLS/UL (ref 1500–7800)
ALBUMIN/GLOBULIN RATIO: 1.5 (CALC) (ref 1–2.5)
ALBUMIN: 4.2 G/DL (ref 3.6–5.1)
ALKALINE PHOSPHATASE: 53 U/L (ref 35–144)
ALT: 16 U/L (ref 9–46)
AST: 13 U/L (ref 10–35)
BASOPHILS: 0.5 %
BILIRUBIN, TOTAL: 0.9 MG/DL (ref 0.2–1.2)
BUN: 14 MG/DL (ref 7–25)
CALCIUM: 9.4 MG/DL (ref 8.6–10.3)
CARBON DIOXIDE: 29 MMOL/L (ref 20–32)
CHLORIDE: 101 MMOL/L (ref 98–110)
CREATININE: 1.12 MG/DL (ref 0.7–1.35)
EGFR: 73 ML/MIN/1.73M2
EOSINOPHILS: 3.5 %
GLOBULIN: 2.8 G/DL (CALC) (ref 1.9–3.7)
GLUCOSE: 182 MG/DL (ref 65–99)
HEMATOCRIT: 45.9 % (ref 38.5–50)
HEMOGLOBIN: 16 G/DL (ref 13.2–17.1)
LYMPHOCYTES: 32.3 %
MCH: 32.9 PG (ref 27–33)
MCHC: 34.9 G/DL (ref 32–36)
MCV: 94.3 FL (ref 80–100)
MONOCYTES: 8 %
MPV: 9.3 FL (ref 7.5–12.5)
NEUTROPHILS: 55.7 %
PLATELET COUNT: 166 THOUSAND/UL (ref 140–400)
POTASSIUM: 4.6 MMOL/L (ref 3.5–5.3)
PROTEIN, TOTAL: 7 G/DL (ref 6.1–8.1)
RDW: 12.6 % (ref 11–15)
RED BLOOD CELL COUNT: 4.87 MILLION/UL (ref 4.2–5.8)
SED RATE BY MODIFIED$WESTERGREN: 6 MM/H
SODIUM: 137 MMOL/L (ref 135–146)
WHITE BLOOD CELL COUNT: 6.3 THOUSAND/UL (ref 3.8–10.8)

## 2022-09-08 ENCOUNTER — OFFICE VISIT (OUTPATIENT)
Dept: RHEUMATOLOGY | Facility: CLINIC | Age: 62
End: 2022-09-08
Payer: COMMERCIAL

## 2022-09-08 VITALS
WEIGHT: 195 LBS | HEART RATE: 89 BPM | BODY MASS INDEX: 29.55 KG/M2 | OXYGEN SATURATION: 97 % | SYSTOLIC BLOOD PRESSURE: 124 MMHG | HEIGHT: 68 IN | DIASTOLIC BLOOD PRESSURE: 80 MMHG | TEMPERATURE: 98 F

## 2022-09-08 DIAGNOSIS — E11.9 TYPE 2 DIABETES MELLITUS WITHOUT COMPLICATION, WITHOUT LONG-TERM CURRENT USE OF INSULIN (HCC): ICD-10-CM

## 2022-09-08 DIAGNOSIS — G89.29 CHRONIC RIGHT SHOULDER PAIN: ICD-10-CM

## 2022-09-08 DIAGNOSIS — L40.9 PSORIASIS: Primary | ICD-10-CM

## 2022-09-08 DIAGNOSIS — M25.511 CHRONIC RIGHT SHOULDER PAIN: ICD-10-CM

## 2022-09-08 DIAGNOSIS — L40.50 PSORIATIC ARTHRITIS (HCC): ICD-10-CM

## 2022-09-08 PROCEDURE — 99214 OFFICE O/P EST MOD 30 MIN: CPT | Performed by: INTERNAL MEDICINE

## 2022-09-08 PROCEDURE — 3074F SYST BP LT 130 MM HG: CPT | Performed by: INTERNAL MEDICINE

## 2022-09-08 PROCEDURE — 3079F DIAST BP 80-89 MM HG: CPT | Performed by: INTERNAL MEDICINE

## 2022-09-08 PROCEDURE — 3008F BODY MASS INDEX DOCD: CPT | Performed by: INTERNAL MEDICINE

## 2022-09-08 RX ORDER — ADALIMUMAB 40MG/0.4ML
1 KIT SUBCUTANEOUS
Qty: 2 EACH | Refills: 3 | Status: CANCELLED
Start: 2022-09-08

## 2022-09-08 RX ORDER — FOLIC ACID 1 MG/1
1 TABLET ORAL DAILY
Qty: 90 TABLET | Refills: 3 | Status: CANCELLED
Start: 2022-09-08

## 2022-09-08 NOTE — PATIENT INSTRUCTIONS
Trial of PT for right shoulder pain which is most likely due to wear-and-tear arthritis and some rotator cuff issues. Find a physical therapist near your  home and try it out. In the meantime you can take ibuprofen over-the-counter 1 or 2 tablets 3 times a day as needed for the right shoulder pain. You can apply over-the-counter salves like Voltaren gel or Aspercreme 3 times a day to his right shoulder. Putting a heating pad on it and doing mild stretching circular exercises be helpful. For psoriatic arthritis continue Humira every 2 weeks as an injection. Continue methotrexate 3 tablets/week with folic acid 1 mg/day. Both your psoriatic arthritis and your psoriasis are stable. Also your lab tests are stable. Return to office for recheck 4 months.

## 2022-10-14 ENCOUNTER — TELEPHONE (OUTPATIENT)
Dept: INTERNAL MEDICINE CLINIC | Facility: CLINIC | Age: 62
End: 2022-10-14

## 2022-10-14 NOTE — TELEPHONE ENCOUNTER
Pt has scheduled appt for 2nd Shingles inj   Future Appointments   Date Time Provider Sharif Mai   10/18/2022 11:00 AM [facility]

## 2022-10-17 ENCOUNTER — TELEPHONE (OUTPATIENT)
Dept: INTERNAL MEDICINE CLINIC | Facility: CLINIC | Age: 62
End: 2022-10-17

## 2022-10-17 NOTE — TELEPHONE ENCOUNTER
Pt scheduled for 2nd shingles inj   Future Appointments   Date Time Provider Sharif Mai   10/18/2022 11:00 AM [facility]

## 2022-10-18 ENCOUNTER — NURSE ONLY (OUTPATIENT)
Dept: INTERNAL MEDICINE CLINIC | Facility: CLINIC | Age: 62
End: 2022-10-18
Payer: COMMERCIAL

## 2022-10-18 PROCEDURE — 90750 HZV VACC RECOMBINANT IM: CPT | Performed by: INTERNAL MEDICINE

## 2022-10-18 PROCEDURE — 90471 IMMUNIZATION ADMIN: CPT | Performed by: INTERNAL MEDICINE

## 2022-11-01 DIAGNOSIS — L40.50 PSORIATIC ARTHRITIS (HCC): ICD-10-CM

## 2022-11-01 RX ORDER — ADALIMUMAB 40MG/0.4ML
KIT SUBCUTANEOUS
Qty: 2 EACH | Refills: 5 | Status: SHIPPED | OUTPATIENT
Start: 2022-11-01

## 2022-11-01 NOTE — TELEPHONE ENCOUNTER
Future Appointments   Date Time Provider Sharif Pau   0/3/4563  0:97 AM Jose Wyatt MD [facility]     Last office visit: 9/08/2022    Last labs: 9/6/2022

## 2022-12-13 ENCOUNTER — PATIENT MESSAGE (OUTPATIENT)
Dept: INTERNAL MEDICINE CLINIC | Facility: CLINIC | Age: 62
End: 2022-12-13

## 2022-12-16 ENCOUNTER — MED REC SCAN ONLY (OUTPATIENT)
Dept: INTERNAL MEDICINE CLINIC | Facility: CLINIC | Age: 62
End: 2022-12-16

## 2023-02-01 ENCOUNTER — TELEPHONE (OUTPATIENT)
Dept: RHEUMATOLOGY | Facility: CLINIC | Age: 63
End: 2023-02-01

## 2023-02-01 DIAGNOSIS — N40.1 BENIGN PROSTATIC HYPERPLASIA WITH WEAK URINARY STREAM: Primary | ICD-10-CM

## 2023-02-01 DIAGNOSIS — R39.12 BENIGN PROSTATIC HYPERPLASIA WITH WEAK URINARY STREAM: Primary | ICD-10-CM

## 2023-02-04 LAB
ABSOLUTE BASOPHILS: 31 CELLS/UL (ref 0–200)
ABSOLUTE EOSINOPHILS: 205 CELLS/UL (ref 15–500)
ABSOLUTE LYMPHOCYTES: 2145 CELLS/UL (ref 850–3900)
ABSOLUTE MONOCYTES: 676 CELLS/UL (ref 200–950)
ABSOLUTE NEUTROPHILS: 3143 CELLS/UL (ref 1500–7800)
ALBUMIN/GLOBULIN RATIO: 1.5 (CALC) (ref 1–2.5)
ALBUMIN: 4.2 G/DL (ref 3.6–5.1)
ALKALINE PHOSPHATASE: 52 U/L (ref 35–144)
ALT: 16 U/L (ref 9–46)
AST: 12 U/L (ref 10–35)
BASOPHILS: 0.5 %
BILIRUBIN, TOTAL: 1.1 MG/DL (ref 0.2–1.2)
BUN: 16 MG/DL (ref 7–25)
CALCIUM: 9.8 MG/DL (ref 8.6–10.3)
CARBON DIOXIDE: 31 MMOL/L (ref 20–32)
CHLORIDE: 100 MMOL/L (ref 98–110)
CREATININE: 1.06 MG/DL (ref 0.7–1.35)
EGFR: 78 ML/MIN/1.73M2
EOSINOPHILS: 3.3 %
GLOBULIN: 2.8 G/DL (CALC) (ref 1.9–3.7)
GLUCOSE: 166 MG/DL (ref 65–99)
HEMATOCRIT: 49 % (ref 38.5–50)
HEMOGLOBIN: 17 G/DL (ref 13.2–17.1)
LYMPHOCYTES: 34.6 %
MCH: 32.7 PG (ref 27–33)
MCHC: 34.7 G/DL (ref 32–36)
MCV: 94.2 FL (ref 80–100)
MONOCYTES: 10.9 %
MPV: 9.6 FL (ref 7.5–12.5)
NEUTROPHILS: 50.7 %
PLATELET COUNT: 179 THOUSAND/UL (ref 140–400)
POTASSIUM: 4.2 MMOL/L (ref 3.5–5.3)
PROTEIN, TOTAL: 7 G/DL (ref 6.1–8.1)
PSA, TOTAL: 2.43 NG/ML
RDW: 13 % (ref 11–15)
RED BLOOD CELL COUNT: 5.2 MILLION/UL (ref 4.2–5.8)
SED RATE BY MODIFIED$WESTERGREN: 2 MM/H
SODIUM: 138 MMOL/L (ref 135–146)
WHITE BLOOD CELL COUNT: 6.2 THOUSAND/UL (ref 3.8–10.8)

## 2023-02-08 ENCOUNTER — OFFICE VISIT (OUTPATIENT)
Dept: RHEUMATOLOGY | Facility: CLINIC | Age: 63
End: 2023-02-08
Payer: COMMERCIAL

## 2023-02-08 VITALS
OXYGEN SATURATION: 98 % | HEART RATE: 80 BPM | RESPIRATION RATE: 20 BRPM | DIASTOLIC BLOOD PRESSURE: 88 MMHG | BODY MASS INDEX: 30 KG/M2 | SYSTOLIC BLOOD PRESSURE: 130 MMHG | WEIGHT: 196 LBS

## 2023-02-08 DIAGNOSIS — L40.9 PSORIASIS: ICD-10-CM

## 2023-02-08 DIAGNOSIS — L40.50 PSORIATIC ARTHRITIS (HCC): Primary | ICD-10-CM

## 2023-02-08 PROCEDURE — 3079F DIAST BP 80-89 MM HG: CPT | Performed by: INTERNAL MEDICINE

## 2023-02-08 PROCEDURE — 99214 OFFICE O/P EST MOD 30 MIN: CPT | Performed by: INTERNAL MEDICINE

## 2023-02-08 PROCEDURE — 3075F SYST BP GE 130 - 139MM HG: CPT | Performed by: INTERNAL MEDICINE

## 2023-02-08 NOTE — PATIENT INSTRUCTIONS
Humira 40 mg every 2 weeks. Methotrexate 3 tablets per week. Folic acid 1 mg daily. Labs are good except morning glucose 16. Sed rate ok 2. Ibuprofen as needed   Return to often - 4 months.

## 2023-02-18 ENCOUNTER — PATIENT MESSAGE (OUTPATIENT)
Dept: RHEUMATOLOGY | Facility: CLINIC | Age: 63
End: 2023-02-18

## 2023-02-18 DIAGNOSIS — L40.50 PSORIATIC ARTHRITIS (HCC): ICD-10-CM

## 2023-02-18 DIAGNOSIS — E11.9 TYPE 2 DIABETES MELLITUS WITHOUT COMPLICATION, WITHOUT LONG-TERM CURRENT USE OF INSULIN (HCC): ICD-10-CM

## 2023-02-20 NOTE — TELEPHONE ENCOUNTER
From: Gavin Kong  To: Lina Hill MD  Sent: 7/53/7932 9:42 AM CST  Subject: Methotrexate Refill - to [facility] Dr Zeeshan Boone,    Can you please send a refill request for Methotrexate to [place], [place] . It was transferred last week from [address] [place] [address] per your offices request; however, [facility] is saying they need a refill authorization.      Thanks,    Gavin Kong

## 2023-02-20 NOTE — TELEPHONE ENCOUNTER
Future Appointments   Date Time Provider Sharif Mai   5/8/0581  2:74 AM Pam Worthington MD [facility] [place]     LOV 2/8/23  RTO in [place]

## 2023-03-25 ENCOUNTER — TELEPHONE (OUTPATIENT)
Dept: INTERNAL MEDICINE CLINIC | Facility: CLINIC | Age: 63
End: 2023-03-25

## 2023-03-25 DIAGNOSIS — Z13.0 SCREENING FOR DISORDER OF BLOOD AND BLOOD-FORMING ORGANS: ICD-10-CM

## 2023-03-25 DIAGNOSIS — E11.9 TYPE 2 DIABETES MELLITUS WITHOUT COMPLICATION, WITHOUT LONG-TERM CURRENT USE OF INSULIN (HCC): Primary | ICD-10-CM

## 2023-03-25 DIAGNOSIS — Z00.00 ROUTINE GENERAL MEDICAL EXAMINATION AT A HEALTH CARE FACILITY: ICD-10-CM

## 2023-03-25 DIAGNOSIS — Z13.29 SCREENING FOR THYROID DISORDER: ICD-10-CM

## 2023-03-25 DIAGNOSIS — Z13.228 SCREENING FOR METABOLIC DISORDER: ICD-10-CM

## 2023-03-25 DIAGNOSIS — Z13.220 SCREENING FOR LIPID DISORDERS: ICD-10-CM

## 2023-03-25 NOTE — TELEPHONE ENCOUNTER
Orders to  [facility]   Pt aware to get labs done no sooner than 2 weeks prior to the appt. Pt aware to fast.  No call back required.     Future Appointments   Date Time Provider Sharif Pau   6/1/2023  9:00 AM Sean Clinton MD [facility]

## 2023-03-27 RX ORDER — PERPHENAZINE 16 MG/1
1 TABLET, FILM COATED ORAL 2 TIMES DAILY PRN
Qty: 200 STRIP | Refills: 1 | Status: SHIPPED | OUTPATIENT
Start: 2023-03-27 | End: 2023-06-25

## 2023-03-27 RX ORDER — METFORMIN HYDROCHLORIDE 500 MG/1
1000 TABLET, EXTENDED RELEASE ORAL 2 TIMES DAILY WITH MEALS
Qty: 360 TABLET | Refills: 1 | Status: SHIPPED | OUTPATIENT
Start: 2023-03-27

## 2023-04-19 ENCOUNTER — TELEPHONE (OUTPATIENT)
Dept: RHEUMATOLOGY | Facility: CLINIC | Age: 63
End: 2023-04-19

## 2023-04-19 DIAGNOSIS — L40.50 PSORIATIC ARTHRITIS (HCC): ICD-10-CM

## 2023-04-19 RX ORDER — ADALIMUMAB 40MG/0.4ML
1 KIT SUBCUTANEOUS
Qty: 2 EACH | Refills: 2 | Status: SHIPPED | OUTPATIENT
Start: 2023-04-19

## 2023-04-19 NOTE — TELEPHONE ENCOUNTER
Refill request from [facility] Appointments   Date Time Provider Sharif Mai   6/1/2023  9:00 AM Floresita Banda MD [facility]   3/31/1768 77:31 AM Bello Villagran MD [facility] [place]     LOV 2/8/23  RTO in 4mo

## 2023-05-30 ENCOUNTER — TELEPHONE (OUTPATIENT)
Dept: INTERNAL MEDICINE CLINIC | Facility: CLINIC | Age: 63
End: 2023-05-30

## 2023-05-30 NOTE — TELEPHONE ENCOUNTER
Future Appointments   Date Time Provider Sharif Pau   7/21/2023  9:00 AM Dragan Barboza MD [facility]     Informed must fast no call back required.  Orders to [facility]

## 2023-07-18 PROCEDURE — 3044F HG A1C LEVEL LT 7.0%: CPT | Performed by: INTERNAL MEDICINE

## 2023-07-18 PROCEDURE — 3061F NEG MICROALBUMINURIA REV: CPT | Performed by: INTERNAL MEDICINE

## 2023-07-19 LAB
ABSOLUTE BASOPHILS: 43 CELLS/UL (ref 0–200)
ABSOLUTE EOSINOPHILS: 201 CELLS/UL (ref 15–500)
ABSOLUTE LYMPHOCYTES: 2037 CELLS/UL (ref 850–3900)
ABSOLUTE MONOCYTES: 567 CELLS/UL (ref 200–950)
ABSOLUTE NEUTROPHILS: 3251 CELLS/UL (ref 1500–7800)
ALBUMIN/GLOBULIN RATIO: 1.5 (CALC) (ref 1–2.5)
ALBUMIN: 4.3 G/DL (ref 3.6–5.1)
ALKALINE PHOSPHATASE: 43 U/L (ref 35–144)
ALT: 15 U/L (ref 9–46)
AST: 12 U/L (ref 10–35)
BASOPHILS: 0.7 %
BILIRUBIN, TOTAL: 0.8 MG/DL (ref 0.2–1.2)
BUN: 18 MG/DL (ref 7–25)
CALCIUM: 9.4 MG/DL (ref 8.6–10.3)
CARBON DIOXIDE: 27 MMOL/L (ref 20–32)
CHLORIDE: 102 MMOL/L (ref 98–110)
CHOL/HDLC RATIO: 5.1 (CALC)
CHOLESTEROL, TOTAL: 220 MG/DL
CREATININE, RANDOM URINE: 134 MG/DL (ref 20–320)
CREATININE: 1.11 MG/DL (ref 0.7–1.35)
EGFR: 74 ML/MIN/1.73M2
EOSINOPHILS: 3.3 %
GLOBULIN: 2.8 G/DL (CALC) (ref 1.9–3.7)
GLUCOSE: 110 MG/DL (ref 65–99)
HDL CHOLESTEROL: 43 MG/DL
HEMATOCRIT: 45.7 % (ref 38.5–50)
HEMOGLOBIN A1C: 5.4 % OF TOTAL HGB
HEMOGLOBIN: 15.4 G/DL (ref 13.2–17.1)
LDL-CHOLESTEROL: 156 MG/DL (CALC)
LYMPHOCYTES: 33.4 %
MCH: 32.7 PG (ref 27–33)
MCHC: 33.7 G/DL (ref 32–36)
MCV: 97 FL (ref 80–100)
MICROALBUMIN/CREATININE RATIO, RANDOM URINE: 12 MCG/MG CREAT
MICROALBUMIN: 1.6 MG/DL
MONOCYTES: 9.3 %
MPV: 9.5 FL (ref 7.5–12.5)
NEUTROPHILS: 53.3 %
NON-HDL CHOLESTEROL: 177 MG/DL (CALC)
PLATELET COUNT: 168 THOUSAND/UL (ref 140–400)
POTASSIUM: 4.5 MMOL/L (ref 3.5–5.3)
PROTEIN, TOTAL: 7.1 G/DL (ref 6.1–8.1)
RDW: 12.3 % (ref 11–15)
RED BLOOD CELL COUNT: 4.71 MILLION/UL (ref 4.2–5.8)
SED RATE BY MODIFIED$WESTERGREN: 6 MM/H
SODIUM: 138 MMOL/L (ref 135–146)
TRIGLYCERIDES: 100 MG/DL
TSH W/REFLEX TO FT4: 2.34 MIU/L (ref 0.4–4.5)
WHITE BLOOD CELL COUNT: 6.1 THOUSAND/UL (ref 3.8–10.8)

## 2023-07-21 ENCOUNTER — OFFICE VISIT (OUTPATIENT)
Dept: INTERNAL MEDICINE CLINIC | Facility: CLINIC | Age: 63
End: 2023-07-21
Payer: COMMERCIAL

## 2023-07-21 VITALS
DIASTOLIC BLOOD PRESSURE: 80 MMHG | BODY MASS INDEX: 27.25 KG/M2 | OXYGEN SATURATION: 97 % | HEART RATE: 69 BPM | SYSTOLIC BLOOD PRESSURE: 118 MMHG | WEIGHT: 184 LBS | HEIGHT: 68.9 IN

## 2023-07-21 DIAGNOSIS — L40.9 PSORIASIS: ICD-10-CM

## 2023-07-21 DIAGNOSIS — E80.4 GILBERT'S SYNDROME: ICD-10-CM

## 2023-07-21 DIAGNOSIS — E78.00 PURE HYPERCHOLESTEROLEMIA: ICD-10-CM

## 2023-07-21 DIAGNOSIS — M75.82 TENDINITIS OF LEFT ROTATOR CUFF: ICD-10-CM

## 2023-07-21 DIAGNOSIS — Z00.00 ROUTINE GENERAL MEDICAL EXAMINATION AT A HEALTH CARE FACILITY: Primary | ICD-10-CM

## 2023-07-21 DIAGNOSIS — E11.9 TYPE 2 DIABETES MELLITUS WITHOUT COMPLICATION, WITHOUT LONG-TERM CURRENT USE OF INSULIN (HCC): ICD-10-CM

## 2023-07-21 DIAGNOSIS — L40.50 PSORIATIC ARTHRITIS (HCC): ICD-10-CM

## 2023-07-21 PROCEDURE — 3074F SYST BP LT 130 MM HG: CPT | Performed by: INTERNAL MEDICINE

## 2023-07-21 PROCEDURE — 90677 PCV20 VACCINE IM: CPT | Performed by: INTERNAL MEDICINE

## 2023-07-21 PROCEDURE — 3079F DIAST BP 80-89 MM HG: CPT | Performed by: INTERNAL MEDICINE

## 2023-07-21 PROCEDURE — 99397 PER PM REEVAL EST PAT 65+ YR: CPT | Performed by: INTERNAL MEDICINE

## 2023-07-21 PROCEDURE — 3008F BODY MASS INDEX DOCD: CPT | Performed by: INTERNAL MEDICINE

## 2023-07-21 PROCEDURE — 90471 IMMUNIZATION ADMIN: CPT | Performed by: INTERNAL MEDICINE

## 2023-09-21 ENCOUNTER — PATIENT MESSAGE (OUTPATIENT)
Dept: INTERNAL MEDICINE CLINIC | Facility: CLINIC | Age: 63
End: 2023-09-21

## 2023-09-21 NOTE — TELEPHONE ENCOUNTER
From: Jp Hdez  To: Dragan Barboza  Sent: 9/21/2023 7:31 AM CDT  Subject: Covid Vac. Flu Vac    Good Morning,    1) Do you want me to get both Covid and Flu shots this year? (Have had 4 Covids none recently.)  2) When, and if both should I get them at the same time?     Hope all is well,    Jp Hdez

## 2023-09-28 ENCOUNTER — OFFICE VISIT (OUTPATIENT)
Dept: RHEUMATOLOGY | Facility: CLINIC | Age: 63
End: 2023-09-28
Payer: COMMERCIAL

## 2023-09-28 ENCOUNTER — TELEPHONE (OUTPATIENT)
Dept: INTERNAL MEDICINE CLINIC | Facility: CLINIC | Age: 63
End: 2023-09-28

## 2023-09-28 VITALS
TEMPERATURE: 98 F | RESPIRATION RATE: 16 BRPM | BODY MASS INDEX: 28.7 KG/M2 | DIASTOLIC BLOOD PRESSURE: 78 MMHG | SYSTOLIC BLOOD PRESSURE: 146 MMHG | HEIGHT: 68 IN | OXYGEN SATURATION: 93 % | HEART RATE: 88 BPM | WEIGHT: 189.38 LBS

## 2023-09-28 DIAGNOSIS — G89.29 CHRONIC LEFT SHOULDER PAIN: ICD-10-CM

## 2023-09-28 DIAGNOSIS — L40.9 PSORIASIS: ICD-10-CM

## 2023-09-28 DIAGNOSIS — L40.50 PSORIATIC ARTHRITIS (HCC): Primary | ICD-10-CM

## 2023-09-28 DIAGNOSIS — E11.9 TYPE 2 DIABETES MELLITUS WITHOUT COMPLICATION, WITHOUT LONG-TERM CURRENT USE OF INSULIN (HCC): ICD-10-CM

## 2023-09-28 DIAGNOSIS — M25.512 CHRONIC LEFT SHOULDER PAIN: ICD-10-CM

## 2023-09-28 PROCEDURE — 99214 OFFICE O/P EST MOD 30 MIN: CPT | Performed by: INTERNAL MEDICINE

## 2023-09-28 PROCEDURE — 3008F BODY MASS INDEX DOCD: CPT | Performed by: INTERNAL MEDICINE

## 2023-09-28 PROCEDURE — 3078F DIAST BP <80 MM HG: CPT | Performed by: INTERNAL MEDICINE

## 2023-09-28 PROCEDURE — 3077F SYST BP >= 140 MM HG: CPT | Performed by: INTERNAL MEDICINE

## 2023-09-28 NOTE — PATIENT INSTRUCTIONS
Continue methotrexate 3 tablets/week. Folic acid 1 mg/day. Humira injection 40 mg every 2 weeks. When you have your upcoming vaccinations for flu and COVID, skip your methotrexate for 2 weeks. In other words skip it the week of your vaccination and the week following the vaccination. Current labs are stable. Continue therapy for your left frozen shoulder. Return to office for recheck 4 months.

## 2023-11-21 ENCOUNTER — TELEPHONE (OUTPATIENT)
Dept: INTERNAL MEDICINE CLINIC | Facility: CLINIC | Age: 63
End: 2023-11-21

## 2023-12-07 ENCOUNTER — OFFICE VISIT (OUTPATIENT)
Dept: RHEUMATOLOGY | Facility: CLINIC | Age: 63
End: 2023-12-07
Payer: COMMERCIAL

## 2023-12-07 VITALS
WEIGHT: 194 LBS | DIASTOLIC BLOOD PRESSURE: 70 MMHG | TEMPERATURE: 97 F | BODY MASS INDEX: 29.4 KG/M2 | SYSTOLIC BLOOD PRESSURE: 142 MMHG | RESPIRATION RATE: 16 BRPM | OXYGEN SATURATION: 96 % | HEIGHT: 68 IN | HEART RATE: 76 BPM

## 2023-12-07 DIAGNOSIS — L40.50 PSORIATIC ARTHRITIS (HCC): Primary | ICD-10-CM

## 2023-12-07 DIAGNOSIS — M25.512 CHRONIC LEFT SHOULDER PAIN: ICD-10-CM

## 2023-12-07 DIAGNOSIS — G89.29 CHRONIC LEFT SHOULDER PAIN: ICD-10-CM

## 2023-12-07 DIAGNOSIS — M75.02 ADHESIVE CAPSULITIS OF LEFT SHOULDER: ICD-10-CM

## 2023-12-07 RX ORDER — IBUPROFEN 800 MG/1
TABLET ORAL
COMMUNITY
Start: 2023-10-16

## 2023-12-07 RX ORDER — METHYLPREDNISOLONE ACETATE 40 MG/ML
40 INJECTION, SUSPENSION INTRA-ARTICULAR; INTRALESIONAL; INTRAMUSCULAR; SOFT TISSUE ONCE
Status: COMPLETED | OUTPATIENT
Start: 2023-12-07 | End: 2023-12-07

## 2023-12-07 RX ADMIN — METHYLPREDNISOLONE ACETATE 40 MG: 40 INJECTION, SUSPENSION INTRA-ARTICULAR; INTRALESIONAL; INTRAMUSCULAR; SOFT TISSUE at 17:47:00

## 2023-12-07 NOTE — PROCEDURES
[address]807  Left shoulder injection. Left shoulder pain. Adhesive capsulitis left shoulder. After doing a timeout, and identifying anterior left shoulder, left shoulder was cleansed with Hibiclens and alcohol wipes. In the left shoulder was injected with 40 mg of methylprednisolone and 1 cc of 1% lidocaine. Patient tolerated the shoulder injection without incident.

## 2023-12-07 NOTE — PATIENT INSTRUCTIONS
For your psoriatic arthritis continue your Humira 40 mg every 2 weeks. Continue methotrexate 3 tablets/week. Continue folic acid 1 mg a day. Continue to do stretching exercises and range of motion exercises for your left shoulder. Today your left shoulder was injected with cortisone in the hopes of helping to relieve your frozen shoulder problem. Lab tests were ordered due before the end of the year. Return to office for recheck 4 months.

## 2024-01-01 DIAGNOSIS — L40.9 PSORIASIS: ICD-10-CM

## 2024-01-01 DIAGNOSIS — L40.50 PSORIATIC ARTHRITIS (HCC): Primary | ICD-10-CM

## 2024-01-02 NOTE — TELEPHONE ENCOUNTER
LOV:   12/07/2023        Future Appointments   Date Time Provider Department Center   1/29/2024  3:00 PM Benigno Martinez MD [facility] [place]   4/8/2024 10:20 AM Benigno Martinez MD [facility] [place]       LF:   04/19/2023     QTY:   2 each   Refills:   2    LABS:        Component      Latest Ref Rng 7/18/2023   WBC      3.8 - 10.8 Thousand/uL 6.1    RBC      4.20 - 5.80 Million/uL 4.71    Hemoglobin      13.2 - 17.1 g/dL 15.4    Hematocrit      38.5 - 50.0 % 45.7    MCV      80.0 - 100.0 fL 97.0    MCH      27.0 - 33.0 pg 32.7    MCHC      32.0 - 36.0 g/dL 33.7    RDW      11.0 - 15.0 % 12.3    Platelet Count      140 - 400 Thousand/uL 168    MPV      7.5 - 12.5 fL 9.5    Neutrophils Absolute      1,500 - 7,800 cells/uL 3,251    Lymphocytes Absolute      850 - 3,900 cells/uL 2,037    Monocytes Absolute      200 - 950 cells/uL 567    Eosinophils Absolute      15 - 500 cells/uL 201    Basophils Absolute      0 - 200 cells/uL 43    Neutrophils %      % 53.3    Lymphocytes %      % 33.4    Monocytes %      % 9.3    Eosinophils %      % 3.3    Basophils %      % 0.7    Glucose      65 - 99 mg/dL 110 (H)    BUN      7 - 25 mg/dL 18    CREATININE      0.70 - 1.35 mg/dL 1.11    EGFR      > OR = 60 mL/min/1.73m2 74    BUN/CREATININE RATIO      6 - 22 (calc) NOT APPLICABLE    Sodium      135 - 146 mmol/L 138    Potassium      3.5 - 5.3 mmol/L 4.5    Chloride      98 - 110 mmol/L 102    Carbon Dioxide, Total      20 - 32 mmol/L 27    CALCIUM      8.6 - 10.3 mg/dL 9.4    PROTEIN, TOTAL      6.1 - 8.1 g/dL 7.1    Albumin      3.6 - 5.1 g/dL 4.3    Globulin      1.9 - 3.7 g/dL (calc) 2.8    A/G Ratio      1.0 - 2.5 (calc) 1.5    Total Bilirubin      0.2 - 1.2 mg/dL 0.8    Alkaline Phosphatase      35 - 144 U/L 43    AST (SGOT)      10 - 35 U/L 12    ALT (SGPT)      9 - 46 U/L 15       Legend:  (H) High

## 2024-01-03 RX ORDER — ADALIMUMAB 40MG/0.4ML
1 KIT SUBCUTANEOUS
Qty: 2 EACH | Refills: 0 | Status: SHIPPED | OUTPATIENT
Start: 2024-01-03

## 2024-02-23 RX ORDER — METFORMIN HYDROCHLORIDE 500 MG/1
1000 TABLET, EXTENDED RELEASE ORAL 2 TIMES DAILY WITH MEALS
Qty: 360 TABLET | Refills: 0 | Status: SHIPPED | OUTPATIENT
Start: 2024-02-23

## 2024-02-26 DIAGNOSIS — L40.9 PSORIASIS: ICD-10-CM

## 2024-02-26 DIAGNOSIS — L40.50 PSORIATIC ARTHRITIS (HCC): ICD-10-CM

## 2024-02-26 RX ORDER — ADALIMUMAB 40MG/0.4ML
1 KIT SUBCUTANEOUS
Qty: 2 EACH | Refills: 5 | Status: SHIPPED | OUTPATIENT
Start: 2024-02-26

## 2024-02-26 NOTE — TELEPHONE ENCOUNTER
Last office visit: 12/07/2023    Next Rheum Apt:4/8/2024 Benigno Martinez MD    Last fill: 01/03/2024, 2 each, 0 refills    Labs:   Lab Results   Component Value Date    CREATSERUM 1.11 07/18/2023    GFR 70 07/22/2016    ALKPHO 43 07/18/2023    AST 12 07/18/2023    ALT 15 07/18/2023    BILT 0.8 07/18/2023    TP 7.1 07/18/2023    ALB 4.3 07/18/2023       Lab Results   Component Value Date    WBC 6.1 07/18/2023    HGB 15.4 07/18/2023     07/18/2023    NEPRELIM 2.99 07/22/2016    NEPERCENT 53.3 07/18/2023    LYPERCENT 33.4 07/18/2023    NE 3,251 07/18/2023    LYMABS 2,037 07/18/2023

## 2024-07-19 DIAGNOSIS — L40.50 PSORIATIC ARTHRITIS (HCC): ICD-10-CM

## 2024-07-19 DIAGNOSIS — L40.9 PSORIASIS: ICD-10-CM

## 2024-07-19 RX ORDER — ADALIMUMAB 40MG/0.4ML
1 KIT SUBCUTANEOUS
Qty: 2 EACH | Refills: 5 | Status: SHIPPED | OUTPATIENT
Start: 2024-07-19

## 2024-07-19 NOTE — TELEPHONE ENCOUNTER
LOV:  12/07/2023     No future appointments.    LF:  02/26/2024    QTY:   2 Each    Refills:   5     LABS:    Component      Latest Ref Rng 7/18/2023   WBC      3.8 - 10.8 Thousand/uL 6.1    RBC      4.20 - 5.80 Million/uL 4.71    Hemoglobin      13.2 - 17.1 g/dL 15.4    Hematocrit      38.5 - 50.0 % 45.7    MCV      80.0 - 100.0 fL 97.0    MCH      27.0 - 33.0 pg 32.7    MCHC      32.0 - 36.0 g/dL 33.7    RDW      11.0 - 15.0 % 12.3    Platelet Count      140 - 400 Thousand/uL 168    MPV      7.5 - 12.5 fL 9.5    Neutrophils Absolute      1,500 - 7,800 cells/uL 3,251    Lymphocytes Absolute      850 - 3,900 cells/uL 2,037    Monocytes Absolute      200 - 950 cells/uL 567    Eosinophils Absolute      15 - 500 cells/uL 201    Basophils Absolute      0 - 200 cells/uL 43    Neutrophils %      % 53.3    Lymphocytes %      % 33.4    Monocytes %      % 9.3    Eosinophils %      % 3.3    Basophils %      % 0.7    Glucose      65 - 99 mg/dL 110 (H)    BUN      7 - 25 mg/dL 18    CREATININE      0.70 - 1.35 mg/dL 1.11    EGFR      > OR = 60 mL/min/1.73m2 74    BUN/CREATININE RATIO      6 - 22 (calc) NOT APPLICABLE    Sodium      135 - 146 mmol/L 138    Potassium      3.5 - 5.3 mmol/L 4.5    Chloride      98 - 110 mmol/L 102    Carbon Dioxide, Total      20 - 32 mmol/L 27    CALCIUM      8.6 - 10.3 mg/dL 9.4    PROTEIN, TOTAL      6.1 - 8.1 g/dL 7.1    Albumin      3.6 - 5.1 g/dL 4.3    Globulin      1.9 - 3.7 g/dL (calc) 2.8    A/G Ratio      1.0 - 2.5 (calc) 1.5    Total Bilirubin      0.2 - 1.2 mg/dL 0.8    Alkaline Phosphatase      35 - 144 U/L 43    AST (SGOT)      10 - 35 U/L 12    ALT (SGPT)      9 - 46 U/L 15       Legend:  (H) High

## 2024-09-18 DIAGNOSIS — L40.50 PSORIATIC ARTHRITIS (HCC): ICD-10-CM

## 2024-09-18 DIAGNOSIS — E11.9 TYPE 2 DIABETES MELLITUS WITHOUT COMPLICATION, WITHOUT LONG-TERM CURRENT USE OF INSULIN (HCC): ICD-10-CM

## 2024-09-19 NOTE — TELEPHONE ENCOUNTER
Last office visit: 12/7/2023    Next Rheum Apt:11/5/2024 Benigno Martinez MD    Last fill: 2/20/2023 72 tab, 3 refills    Sending pt my chart message that he needs a follow up appointment and lab work done  Labs:   Lab Results   Component Value Date    CREATSERUM 1.11 07/18/2023    GFR 70 07/22/2016    ALKPHO 43 07/18/2023    AST 12 07/18/2023    ALT 15 07/18/2023    BILT 0.8 07/18/2023    TP 7.1 07/18/2023    ALB 4.3 07/18/2023       Lab Results   Component Value Date    WBC 6.1 07/18/2023    HGB 15.4 07/18/2023     07/18/2023    NEPRELIM 2.99 07/22/2016    NEPERCENT 53.3 07/18/2023    LYPERCENT 33.4 07/18/2023    NE 3,251 07/18/2023    LYMABS 2,037 07/18/2023

## 2024-09-24 ENCOUNTER — TELEPHONE (OUTPATIENT)
Facility: CLINIC | Age: 64
End: 2024-09-24

## 2024-09-24 DIAGNOSIS — L40.50 PSORIATIC ARTHRITIS (HCC): ICD-10-CM

## 2024-09-24 DIAGNOSIS — E11.9 TYPE 2 DIABETES MELLITUS WITHOUT COMPLICATION, WITHOUT LONG-TERM CURRENT USE OF INSULIN (HCC): ICD-10-CM

## 2024-09-24 RX ORDER — METHOTREXATE 2.5 MG/1
7.5 TABLET ORAL WEEKLY
Qty: 36 TABLET | Refills: 3 | Status: SHIPPED | OUTPATIENT
Start: 2024-09-24 | End: 2025-09-19

## 2024-09-24 RX ORDER — METHOTREXATE 2.5 MG/1
TABLET ORAL
Qty: 72 TABLET | Refills: 3 | OUTPATIENT
Start: 2024-09-24

## 2024-09-24 RX ORDER — METHOTREXATE 2.5 MG/1
TABLET ORAL
Qty: 72 TABLET | Refills: 3 | Status: SHIPPED | OUTPATIENT
Start: 2024-09-24 | End: 2024-09-24 | Stop reason: CLARIF

## 2024-10-04 ENCOUNTER — TELEPHONE (OUTPATIENT)
Dept: INTERNAL MEDICINE CLINIC | Facility: CLINIC | Age: 64
End: 2024-10-04

## 2024-10-04 NOTE — TELEPHONE ENCOUNTER
Received diabetic eye exam results from [facility].  Abstracted.  Put in A.HARSHAD. bin for review.

## 2024-10-07 LAB
ABSOLUTE BASOPHILS: 30 CELLS/UL (ref 0–200)
ABSOLUTE EOSINOPHILS: 258 CELLS/UL (ref 15–500)
ABSOLUTE LYMPHOCYTES: 2352 CELLS/UL (ref 850–3900)
ABSOLUTE MONOCYTES: 582 CELLS/UL (ref 200–950)
ABSOLUTE NEUTROPHILS: 2778 CELLS/UL (ref 1500–7800)
ALBUMIN/GLOBULIN RATIO: 1.5 (CALC) (ref 1–2.5)
ALBUMIN: 4.3 G/DL (ref 3.6–5.1)
ALKALINE PHOSPHATASE: 40 U/L (ref 35–144)
ALT: 27 U/L (ref 9–46)
AST: 23 U/L (ref 10–35)
BASOPHILS: 0.5 %
BILIRUBIN, TOTAL: 1.5 MG/DL (ref 0.2–1.2)
BUN: 17 MG/DL (ref 7–25)
C-REACTIVE PROTEIN: <3 MG/L
CALCIUM: 9.2 MG/DL (ref 8.6–10.3)
CARBON DIOXIDE: 21 MMOL/L (ref 20–32)
CHLORIDE: 101 MMOL/L (ref 98–110)
CREATININE: 0.93 MG/DL (ref 0.7–1.35)
EGFR: 91 ML/MIN/1.73M2
EOSINOPHILS: 4.3 %
GLOBULIN: 2.8 G/DL (CALC) (ref 1.9–3.7)
GLUCOSE: 164 MG/DL (ref 65–99)
HEMATOCRIT: 45.7 % (ref 38.5–50)
HEMOGLOBIN: 15.7 G/DL (ref 13.2–17.1)
LYMPHOCYTES: 39.2 %
MCH: 33.3 PG (ref 27–33)
MCHC: 34.4 G/DL (ref 32–36)
MCV: 97 FL (ref 80–100)
MONOCYTES: 9.7 %
MPV: 9.6 FL (ref 7.5–12.5)
NEUTROPHILS: 46.3 %
PLATELET COUNT: 167 THOUSAND/UL (ref 140–400)
POTASSIUM: 4.3 MMOL/L (ref 3.5–5.3)
PROTEIN, TOTAL: 7.1 G/DL (ref 6.1–8.1)
RDW: 12.1 % (ref 11–15)
RED BLOOD CELL COUNT: 4.71 MILLION/UL (ref 4.2–5.8)
SED RATE BY MODIFIED$WESTERGREN: 2 MM/H
SODIUM: 134 MMOL/L (ref 135–146)
WHITE BLOOD CELL COUNT: 6 THOUSAND/UL (ref 3.8–10.8)

## 2024-10-10 ENCOUNTER — OFFICE VISIT (OUTPATIENT)
Dept: INTERNAL MEDICINE CLINIC | Facility: CLINIC | Age: 64
End: 2024-10-10
Payer: COMMERCIAL

## 2024-10-10 VITALS
SYSTOLIC BLOOD PRESSURE: 136 MMHG | RESPIRATION RATE: 18 BRPM | WEIGHT: 192.19 LBS | HEIGHT: 68 IN | DIASTOLIC BLOOD PRESSURE: 76 MMHG | HEART RATE: 70 BPM | OXYGEN SATURATION: 96 % | BODY MASS INDEX: 29.13 KG/M2 | TEMPERATURE: 97 F

## 2024-10-10 DIAGNOSIS — Z13.0 SCREENING FOR BLOOD DISEASE: ICD-10-CM

## 2024-10-10 DIAGNOSIS — Z00.00 ROUTINE GENERAL MEDICAL EXAMINATION AT A HEALTH CARE FACILITY: Primary | ICD-10-CM

## 2024-10-10 DIAGNOSIS — E80.4 GILBERT'S SYNDROME: ICD-10-CM

## 2024-10-10 DIAGNOSIS — Z13.228 SCREENING FOR METABOLIC DISORDER: ICD-10-CM

## 2024-10-10 DIAGNOSIS — L40.9 PSORIASIS: ICD-10-CM

## 2024-10-10 DIAGNOSIS — L40.50 PSORIATIC ARTHRITIS (HCC): ICD-10-CM

## 2024-10-10 DIAGNOSIS — E11.9 TYPE 2 DIABETES MELLITUS WITHOUT COMPLICATION, WITHOUT LONG-TERM CURRENT USE OF INSULIN (HCC): ICD-10-CM

## 2024-10-10 DIAGNOSIS — Z12.5 SCREENING FOR PROSTATE CANCER: ICD-10-CM

## 2024-10-10 DIAGNOSIS — N40.0 BENIGN NON-NODULAR PROSTATIC HYPERPLASIA WITHOUT LOWER URINARY TRACT SYMPTOMS: ICD-10-CM

## 2024-10-10 DIAGNOSIS — Z13.220 SCREENING FOR LIPID DISORDERS: ICD-10-CM

## 2024-10-10 DIAGNOSIS — Z13.29 SCREENING FOR THYROID DISORDER: ICD-10-CM

## 2024-10-10 DIAGNOSIS — E78.00 PURE HYPERCHOLESTEROLEMIA: ICD-10-CM

## 2024-10-10 PROBLEM — G89.29 CHRONIC LEFT SHOULDER PAIN: Status: RESOLVED | Noted: 2023-09-28 | Resolved: 2024-10-10

## 2024-10-10 PROBLEM — M75.02 ADHESIVE CAPSULITIS OF LEFT SHOULDER: Status: RESOLVED | Noted: 2023-12-07 | Resolved: 2024-10-10

## 2024-10-10 PROBLEM — Z12.11 SPECIAL SCREENING FOR MALIGNANT NEOPLASMS, COLON: Status: RESOLVED | Noted: 2022-07-20 | Resolved: 2024-10-10

## 2024-10-10 PROBLEM — G89.29 CHRONIC FOOT PAIN, RIGHT: Status: RESOLVED | Noted: 2022-07-14 | Resolved: 2024-10-10

## 2024-10-10 PROBLEM — M21.611 BUNION OF GREAT TOE OF RIGHT FOOT: Status: RESOLVED | Noted: 2021-06-21 | Resolved: 2024-10-10

## 2024-10-10 PROBLEM — M25.512 CHRONIC LEFT SHOULDER PAIN: Status: RESOLVED | Noted: 2023-09-28 | Resolved: 2024-10-10

## 2024-10-10 PROBLEM — M79.671 CHRONIC FOOT PAIN, RIGHT: Status: RESOLVED | Noted: 2022-07-14 | Resolved: 2024-10-10

## 2024-10-10 PROCEDURE — 3078F DIAST BP <80 MM HG: CPT | Performed by: INTERNAL MEDICINE

## 2024-10-10 PROCEDURE — 3075F SYST BP GE 130 - 139MM HG: CPT | Performed by: INTERNAL MEDICINE

## 2024-10-10 PROCEDURE — 3008F BODY MASS INDEX DOCD: CPT | Performed by: INTERNAL MEDICINE

## 2024-10-10 PROCEDURE — 99397 PER PM REEVAL EST PAT 65+ YR: CPT | Performed by: INTERNAL MEDICINE

## 2024-10-10 NOTE — PROGRESS NOTES
Jared Gotti  3/9/1960    Chief Complaint   Patient presents with    Well Adult     EJ RM 7- Pt is here for yearly physical       HPI:   Jared Gotti is a 64 year old male who presents for an annual physical examination.    Since last evaluation the patient has overall maintained his usual state of health. However, he reports some admittedly unfavorable dietary and lifestyle changes secondary to frequent travel over the summer months. He has plans to resume his dietary and lifestyle efforts. He has maintained compliance with metformin er 500 mg daily. He reports stable and favorable control of psoriatic arthritis with use of Humira and methotrexate (plus FA supplementation). No acute concerns.    Current Outpatient Medications   Medication Sig Dispense Refill    methotrexate 2.5 MG Oral Tab Take 3 tablets (7.5 mg total) by mouth once a week. 36 tablet 3    HUMIRA, 2 PEN, 40 MG/0.4ML Subcutaneous Pen-injector Kit INJECT THE CONTENTS OF 1 PEN UNDER THE SKIN EVERY 14 DAYS 2 each 5    metFORMIN  MG Oral Tablet 24 Hr Take 2 tablets (1,000 mg total) by mouth 2 (two) times daily with meals. 360 tablet 0    ibuprofen 800 MG Oral Tab       lisinopril 2.5 MG Oral Tab Take 1 tablet (2.5 mg total) by mouth daily. 90 tablet 0    rosuvastatin 5 MG Oral Tab Take 1 tablet (5 mg total) by mouth nightly. 90 tablet 0    folic acid 1 MG Oral Tab Take 1 tablet (1 mg total) by mouth daily. 90 tablet 3    Multiple Vitamins-Minerals (MULTIVITAL) Oral Chew Tab Chew 1 tablet by mouth daily.        Allergies[1]   Past Medical History:    Benign non-nodular prostatic hyperplasia without lower urinary tract symptoms    Diabetes type 2, controlled ([facility])    cured 11.2015    Obesity (BMI 30-39.9)    Psoriatic arthritis ([facility])      Patient Active Problem List   Diagnosis    Psoriatic arthritis ([facility])    Obesity (BMI 30-39.9)    Benign non-nodular prostatic hyperplasia without lower urinary tract symptoms    Type 2 diabetes mellitus without  complication, without long-term current use of insulin (HCC)    Dyslipidemia    Psoriasis    Gilbert's syndrome    Bunion of great toe of right foot    Chronic foot pain, right    Special screening for malignant neoplasms, colon    Pure hypercholesterolemia    Chronic left shoulder pain    Adhesive capsulitis of left shoulder      No past surgical history on file.   Family History   Problem Relation Age of Onset    Diabetes Mother     Stroke Mother     Other (Parkinsin) Father       Social History     Socioeconomic History    Marital status:    Tobacco Use    Smoking status: Never    Smokeless tobacco: Never   Vaping Use    Vaping status: Never Used   Substance and Sexual Activity    Alcohol use: Yes    Drug use: No    Sexual activity: Not Currently         REVIEW OF SYSTEMS:   GENERAL: feels well otherwise  SKIN: no rashes  EYES:denies blurred vision or double vision  HEENT: not congested  LUNGS: denies shortness of breath with exertion  CARDIOVASCULAR: denies chest pain on exertion  GI: no nausea or abdominal pain  NEURO: denies headaches    EXAM:   /76   Pulse 70   Temp 96.8 °F (36 °C) (Temporal)   Resp 18   Ht 5' 8\" (1.727 m)   Wt 192 lb 3.2 oz (87.2 kg)   SpO2 96%   BMI 29.22 kg/m²   GENERAL: Well developed, well nourished,in no apparent distress  SKIN: No rashes,no suspicious lesions  EYES: Bilateral conjunctiva are clear  HEENT: atraumatic, normocephalic. TM WNL BL.  NECK: supple,no adenopathy,no bruits  LUNGS: clear to auscultation  CARDIO: RRR without murmur  GI: good BS's,no masses, HSM or tenderness  Bilateral barefoot skin diabetic exam is normal, visualized feet and the appearance is normal.  Bilateral monofilament/sensation of both feet is normal.  Pulsation pedal pulse exam of both lower legs/feet is normal as well.  ASSESSMENT AND PLAN:   Jared Gotti is a 64 year old male who presents for an annual physical examination.    Outstanding screening and preventive measures:  Influenza  immunization: will obtain from pharmacy    DM2:  Laboratory evaluation pending collection. Continue Metformin  mg daily.  Pure hypercholesterolemia: CMP and lipid panel pending collection: continue rosuvastatin 5 mg daily  No microalbuminuria: continue lisinopril 2.5 mg daily. Urine for microalb pending collection.  UTD with ophthalmologic evaluation: no retinopathy    Psoriatic arthritis and psoriasis:  Stable/controlled with Humira therapy  Continue regular follow-up with rheumatology service  ESR of 6     Gilbert's syndrome:  CMP within normal limits  No management warranted    BPH:  Asymptomatic   No medical management warranted at this time    Follow-up in six months    The patient indicates understanding of these issues and agrees to the plan.  TODAY'S ORDERS     Orders Placed This Encounter   Procedures    CMP Now    Lipids Now    Hemoglobin A1C Now    Microalb/Creat Ratio, Random Urine Now    CBC [6399] [Q]    TSH W REFLEX TO FREE T4 [07253][Q]    PSA - Total [5363][Q]       Meds & Refills:  Requested Prescriptions      No prescriptions requested or ordered in this encounter       Imaging & Consults:  None    No follow-ups on file.  There are no Patient Instructions on file for this visit.    All questions were answered and the patient agrees with the plan.     Thank you,  Dragan Barboza MD       [1]   Allergies  Allergen Reactions    Celebrex [Celecoxib] OTHER (SEE COMMENTS)     Leg pain

## 2024-10-11 LAB
ABSOLUTE BASOPHILS: 28 CELLS/UL (ref 0–200)
ABSOLUTE EOSINOPHILS: 169 CELLS/UL (ref 15–500)
ABSOLUTE LYMPHOCYTES: 2214 CELLS/UL (ref 850–3900)
ABSOLUTE MONOCYTES: 489 CELLS/UL (ref 200–950)
ABSOLUTE NEUTROPHILS: 1800 CELLS/UL (ref 1500–7800)
ALBUMIN/GLOBULIN RATIO: 1.6 (CALC) (ref 1–2.5)
ALBUMIN: 4.4 G/DL (ref 3.6–5.1)
ALKALINE PHOSPHATASE: 51 U/L (ref 35–144)
ALT: 19 U/L (ref 9–46)
AST: 12 U/L (ref 10–35)
BASOPHILS: 0.6 %
BILIRUBIN, TOTAL: 1 MG/DL (ref 0.2–1.2)
BUN: 16 MG/DL (ref 7–25)
CALCIUM: 9.1 MG/DL (ref 8.6–10.3)
CARBON DIOXIDE: 26 MMOL/L (ref 20–32)
CHLORIDE: 102 MMOL/L (ref 98–110)
CHOL/HDLC RATIO: 4.4 (CALC)
CHOLESTEROL, TOTAL: 196 MG/DL
CREATININE, RANDOM URINE: 119 MG/DL (ref 20–320)
CREATININE: 1.04 MG/DL (ref 0.7–1.35)
EGFR: 79 ML/MIN/1.73M2
EOSINOPHILS: 3.6 %
GLOBULIN: 2.8 G/DL (CALC) (ref 1.9–3.7)
GLUCOSE: 212 MG/DL (ref 65–99)
HDL CHOLESTEROL: 45 MG/DL
HEMATOCRIT: 47.4 % (ref 38.5–50)
HEMOGLOBIN A1C: 8.7 % OF TOTAL HGB
HEMOGLOBIN: 16 G/DL (ref 13.2–17.1)
LDL-CHOLESTEROL: 134 MG/DL (CALC)
LYMPHOCYTES: 47.1 %
MCH: 32.9 PG (ref 27–33)
MCHC: 33.8 G/DL (ref 32–36)
MCV: 97.5 FL (ref 80–100)
MICROALBUMIN/CREATININE RATIO, RANDOM URINE: 50 MG/G CREAT
MICROALBUMIN: 6 MG/DL
MONOCYTES: 10.4 %
MPV: 9.4 FL (ref 7.5–12.5)
NEUTROPHILS: 38.3 %
NON-HDL CHOLESTEROL: 151 MG/DL (CALC)
PLATELET COUNT: 147 THOUSAND/UL (ref 140–400)
POTASSIUM: 4.4 MMOL/L (ref 3.5–5.3)
PROTEIN, TOTAL: 7.2 G/DL (ref 6.1–8.1)
PSA, TOTAL: 3.12 NG/ML
RDW: 12.1 % (ref 11–15)
RED BLOOD CELL COUNT: 4.86 MILLION/UL (ref 4.2–5.8)
SODIUM: 137 MMOL/L (ref 135–146)
TRIGLYCERIDES: 78 MG/DL
TSH W/REFLEX TO FT4: 1.35 MIU/L (ref 0.4–4.5)
WHITE BLOOD CELL COUNT: 4.7 THOUSAND/UL (ref 3.8–10.8)

## 2024-10-18 DIAGNOSIS — E11.9 TYPE 2 DIABETES MELLITUS WITHOUT COMPLICATION, WITHOUT LONG-TERM CURRENT USE OF INSULIN (HCC): Primary | ICD-10-CM

## 2024-10-22 RX ORDER — ROSUVASTATIN CALCIUM 5 MG/1
5 TABLET, COATED ORAL NIGHTLY
Qty: 90 TABLET | Refills: 3 | Status: SHIPPED | OUTPATIENT
Start: 2024-10-22

## 2024-10-22 RX ORDER — LISINOPRIL 2.5 MG/1
2.5 TABLET ORAL DAILY
Qty: 90 TABLET | Refills: 3 | Status: SHIPPED | OUTPATIENT
Start: 2024-10-22

## 2024-10-22 NOTE — TELEPHONE ENCOUNTER
Please review.  Last written 6/2022    Office visit 10/10/24: \"Laboratory evaluation pending collection. Continue Metformin  mg daily.  Pure hypercholesterolemia: CMP and lipid panel pending collection: continue rosuvastatin 5 mg daily  No microalbuminuria: continue lisinopril 2.5 mg daily. Urine for microalb pending collection.\"     Requested Prescriptions   Pending Prescriptions Disp Refills    lisinopril 2.5 MG Oral Tab 90 tablet 3     Sig: Take 1 tablet (2.5 mg total) by mouth daily.       Hypertension Medications Protocol Passed - 10/22/2024  9:36 AM        Passed - CMP or BMP in past 12 months        Passed - Last BP reading less than 140/90     BP Readings from Last 1 Encounters:   10/10/24 136/76               Passed - In person appointment or virtual visit in the past 12 mos or appointment in next 3 mos     Recent Outpatient Visits              1 week ago Routine general medical examination at a health care facility    [address] Dragan Barboza MD    Office Visit    10 months ago Psoriatic arthritis ([facility])    [facility] Benigno Martinez MD    Office Visit    1 year ago Psoriatic arthritis ([facility])    [facility] Benigno Martinez MD    Office Visit    1 year ago Routine general medical examination at a health care facility    [address] Dragan Barboza MD    Office Visit    1 year ago Psoriatic arthritis ([facility])    [facility] Benigno Martinez MD    Office Visit          Future Appointments         Provider Department Appt Notes    In 2 weeks Benigno Martinez MD [facility] f/u    In 3 months Dragan Barboza MD [address] follow up appt    In 5 months Dragan Barboza MD [address]  [facility] Follow DM                    Passed - EGFRCR or GFRNAA > 50     GFR Evaluation  EGFRCR: 79 , resulted on 10/10/2024            rosuvastatin 5 MG Oral Tab 90 tablet 3     Sig: Take 1 tablet (5 mg total) by mouth nightly.       Cholesterol Medication Protocol Passed - 10/22/2024  9:36 AM        Passed - ALT < 80     Lab Results   Component Value Date    ALT 19 10/10/2024             Passed - ALT resulted within past year        Passed - Lipid panel within past 12 months     Lab Results   Component Value Date    CHOLEST 196 10/10/2024    TRIG 78 10/10/2024    HDL 45 10/10/2024     (H) 10/10/2024    TCHDLRATIO 4.4 10/10/2024    NONHDLC 151 (H) 10/10/2024             Passed - In person appointment or virtual visit in the past 12 mos or appointment in next 3 mos     Recent Outpatient Visits              1 week ago Routine general medical examination at a health care facility    [address] Dragan Barboza MD    Office Visit    10 months ago Psoriatic arthritis (HCC)    [facility] Benigno Martinez MD    Office Visit    1 year ago Psoriatic arthritis ([facility])    [facility] Benigno Martinez MD    Office Visit    1 year ago Routine general medical examination at a health care facility    [address] Dragan Barboza MD    Office Visit    1 year ago Psoriatic arthritis (HCC)    [facility] Benigno Martinez MD    Office Visit          Future Appointments         Provider Department Appt Notes    In 2 weeks Benigno Martinez MD [facility] f/u    In 3 months Dragan Barboza MD [address] follow up appt    In 5 months Dragan Barboza MD [address] Follow DM                        Future Appointments         Provider Department Appt Notes    In 2 weeks Benigno Martinez MD [facility], [facility] f/u    In 3 months Dragan Barboza MD [address] follow up appt    In 5 months Dragan Barboza MD [address] Follow DM          Recent Outpatient Visits              1 week ago Routine general medical examination at a health care facility    [facility], [address] [place] [address] Dragan Barboza MD    Office Visit    10 months ago Psoriatic arthritis (HCC)    [facility] Benigno Martinez MD    Office Visit    1 year [facility]    [facility]    1 year ago Routine general medical examination at a health care facility    [address] Dragan Barboza MD    Office Visit    1 year ago [facility]    [facility] Visit

## 2024-11-04 ENCOUNTER — TELEPHONE (OUTPATIENT)
Dept: RHEUMATOLOGY | Facility: CLINIC | Age: 64
End: 2024-11-04

## 2024-11-05 ENCOUNTER — OFFICE VISIT (OUTPATIENT)
Dept: RHEUMATOLOGY | Facility: CLINIC | Age: 64
End: 2024-11-05
Payer: COMMERCIAL

## 2024-11-05 ENCOUNTER — TELEPHONE (OUTPATIENT)
Dept: INTERNAL MEDICINE CLINIC | Facility: CLINIC | Age: 64
End: 2024-11-05

## 2024-11-05 VITALS
OXYGEN SATURATION: 97 % | TEMPERATURE: 98 F | WEIGHT: 192.38 LBS | DIASTOLIC BLOOD PRESSURE: 72 MMHG | SYSTOLIC BLOOD PRESSURE: 120 MMHG | BODY MASS INDEX: 29 KG/M2 | HEART RATE: 72 BPM | RESPIRATION RATE: 18 BRPM

## 2024-11-05 DIAGNOSIS — E78.00 PURE HYPERCHOLESTEROLEMIA: ICD-10-CM

## 2024-11-05 DIAGNOSIS — L40.50 PSORIATIC ARTHRITIS (HCC): Primary | ICD-10-CM

## 2024-11-05 DIAGNOSIS — L40.9 PSORIASIS: ICD-10-CM

## 2024-11-05 DIAGNOSIS — E11.9 TYPE 2 DIABETES MELLITUS WITHOUT COMPLICATION, WITHOUT LONG-TERM CURRENT USE OF INSULIN (HCC): ICD-10-CM

## 2024-11-05 PROCEDURE — 3074F SYST BP LT 130 MM HG: CPT | Performed by: INTERNAL MEDICINE

## 2024-11-05 PROCEDURE — 3078F DIAST BP <80 MM HG: CPT | Performed by: INTERNAL MEDICINE

## 2024-11-05 PROCEDURE — 99214 OFFICE O/P EST MOD 30 MIN: CPT | Performed by: INTERNAL MEDICINE

## 2024-11-05 RX ORDER — METHOTREXATE 2.5 MG/1
7.5 TABLET ORAL WEEKLY
Qty: 36 TABLET | Refills: 3 | Status: SHIPPED | OUTPATIENT
Start: 2024-11-05 | End: 2025-10-31

## 2024-11-05 RX ORDER — ADALIMUMAB 40MG/0.4ML
40 KIT SUBCUTANEOUS
Qty: 2 EACH | Refills: 5 | Status: SHIPPED | OUTPATIENT
Start: 2024-11-05

## 2024-11-05 RX ORDER — FOLIC ACID 1 MG/1
1 TABLET ORAL DAILY
Qty: 90 TABLET | Refills: 3 | Status: CANCELLED
Start: 2024-11-05

## 2024-11-05 NOTE — TELEPHONE ENCOUNTER
Pravastatin 10 mg nightly has been ordered as an alternative. This may be better tolerated. Agree with discontinuing rosuvastatin. He may also supplement with Co-Q10, which may facilitate improvement in symptoms.

## 2024-11-05 NOTE — PROGRESS NOTES
[facility]  Dr. Martinez Progress Note     Subjective:   Jared Gotti is a(n) 64 year old male.   Current complaints:   Chief Complaint   Patient presents with    Follow - Up     Follow up. Pt states pain has remained the same since LOV. Converted rapid 3 score of 0.3   History of psoriatic arthritis.  Humira 40 mg every 2 weeks.  Methotrexate 3 tablets/week.  Folic acid 1 mg/day.  Was inn [employer] for work.   Feeling pretty good.   Lost 10 pounds recently.  On Metformin and rovastatin.  Lost feeling in the right index finger and middle finger.  Joint swelling no.  AM stiffness mild.  With weather changes more joint issues.  Walking for exercise.  Walks up the [place] in [place].   Daughter a Marquita at [employer].  Studying OT doctorate.  Needs PA for Humira again.    Skin is good.  Sees Dr. Barboza.    Objective:   /72   Pulse 72   Temp 98 °F (36.7 °C)   Resp 18   Wt 192 lb 6.4 oz (87.3 kg)   SpO2 97%   BMI 29.25 kg/m²   Lungs clear  Heart nsr   Hands non deformed    Knees ok   No leg edema  Skin clear.   10/10/2020 4-12.  Hemoglobin A1c 8.7.  Sodium 137 potassium 4.4 chloride 102 BUN 16 creatinine 1.04 calcium 9.1 GFR 79.  AST 12 ALT 19 bilirubin 1.0 globulin 2.8 cholesterol 196 triglycerides 78 HDL 45 .  Total protein 7.2  albumin 4.4 PSA 3.12  551 TSH 1.35 white count 4.7 hemoglobin 16.0 hematic.  47.54 platelet count 147,000  10/4/2024 sed rate 2.  TJC  0  SJC  0   PtGA  1  MDGA  1  PAGA  2    Assessment:     Encounter Diagnoses   Name Primary?    Psoriatic arthritis (HCC) Yes    Psoriasis     Type 2 diabetes mellitus without complication, without long-term current use of insulin (HCC)     Pure hypercholesterolemia    Psoriatic Arthritis ok.   Plan:     Patient Instructions   Continue Humira 40 mg every 2 weeks.   Methotrexate 3 tablets per week.  Folic acid 1 mg per day.  Metformin daily.  Check with Dr. Barboza about rovastatin induced muscle aches.  Current labs do  show a elevated hemoglobin A1c of 8.7.  Therefore you need to eat a better diet in the lower that caloric intake of glucose.  Also your LDL was 132 which means you need to be on a low-fat diet.  Return to office in 4 months for recheck of labs.        Benigno Martinez MD 11/5/2024 12:29 PM

## 2024-11-05 NOTE — TELEPHONE ENCOUNTER
Patient stated that he is experiencing some shoulder discomfort in both shoulders and he stated that the last time he was on this medication he had some similar effects and wants to know if this is ok or not.    Medication Quantity Refills Start End   rosuvastatin 5 MG Oral Tab 90 tablet 3 10/22/2024 --   Sig:   Take 1 tablet (5 mg total) by mouth nightly.     Route:   Oral     Order #:   [id number]

## 2024-11-05 NOTE — PATIENT INSTRUCTIONS
Continue Humira 40 mg every 2 weeks.   Methotrexate 3 tablets per week.  Folic acid 1 mg per day.  Metformin daily.  Check with Dr. Barboza about rovastatin induced muscle aches.  Current labs do show a elevated hemoglobin A1c of 8.7.  Therefore you need to eat a better diet in the lower that caloric intake of glucose.  Also your LDL was 132 which means you need to be on a low-fat diet.  Return to office in 4 months for recheck of labs.

## 2024-11-05 NOTE — TELEPHONE ENCOUNTER
LOV 10/10/24     Called and spoke with pt. Pt stated he started rosuvastatin a few weeks ago. Pt stated 2 years ago he was on it and was taken off it. 2 years ago shoulder was hurting. Pt believes could be related to the statin. Rheum advised pt reach out to PCP. Didn't do anything physically that would     AD- Pt c/o shoulder pain which started when he started rosuvastatin.

## 2024-11-06 RX ORDER — PRAVASTATIN SODIUM 10 MG
10 TABLET ORAL NIGHTLY
Qty: 30 TABLET | Refills: 0 | Status: CANCELLED | OUTPATIENT
Start: 2024-11-06

## 2024-11-06 NOTE — TELEPHONE ENCOUNTER
Please change orders for   Medication Quantity Refills Start End   pravastatin 10 MG Oral Tab 30 tablet 0 11/5/2024 --   Sig:   Take 1 tablet (10 mg total) by mouth nightly.     Route:   Oral                            [phone], [phone] [[id number]]   Also asking for a 90 day supply  Jared Gotti requesting Medication Refill for:    Medication name and dose (copy and paste from medication list):   metFORMIN  MG Oral Tablet 24 Hr 360 tablet 0 2/23/2024 --   Sig:   Take 2 tablets (1,000 mg total) by mouth 2 (two) times daily with meals.     Route:   Oral     Order #:   [id number]         If medication is not on medication list - transfer patient to RN queue for triage    Preferred Pharmacy: [facility] [facility] [address] [place] [phone], [phone] [[id number]]     LOV: 10/10/2024   Last Refill date:   Next Scheduled appointment: 1/23/2025     Jared Gotti requesting Medication Refill for:    Medication name and dose (co  Medication Quantity Refills Start End   lisinopril 2.5 MG Oral Tab 90 tablet 3 10/22/2024 --   Sig:   Take 1 tablet (2.5 mg total) by mouth daily.     Route:   Oral     py and paste from medication list):     If medication is not on medication list - transfer patient to RN queue for triage    Preferred Pharmacy: [id number] [facility] [address] [place] [phone], [phone] [[id number]]     LOV: 10/10/2024   Last Refill date:   Next Scheduled appointment: 1/23/2025

## 2024-11-07 RX ORDER — PRAVASTATIN SODIUM 10 MG
10 TABLET ORAL NIGHTLY
Qty: 30 TABLET | Refills: 0 | Status: SHIPPED | OUTPATIENT
Start: 2024-11-07

## 2024-11-11 RX ORDER — METFORMIN HYDROCHLORIDE 500 MG/1
1000 TABLET, EXTENDED RELEASE ORAL 2 TIMES DAILY WITH MEALS
Qty: 360 TABLET | Refills: 0 | Status: SHIPPED | OUTPATIENT
Start: 2024-11-11

## 2024-11-11 NOTE — TELEPHONE ENCOUNTER
Please review; protocol failed/ has no protocol    Requested Prescriptions   Pending Prescriptions Disp Refills    metFORMIN  MG Oral Tablet 24 Hr 360 tablet 0     Sig: Take 2 tablets (1,000 mg total) by mouth 2 (two) times daily with meals.       Diabetes Medication Protocol Failed - 11/11/2024  4:18 PM        Failed - Last A1C < 7.5 and within past 6 months     Lab Results   Component Value Date    A1C 8.7 (H) 10/10/2024             Passed - In person appointment or virtual visit in the past 6 mos or appointment in next 3 mos     Recent Outpatient Visits              6 days ago Psoriatic arthritis (HCC)    [facility], [facility] Benigno Martinez MD    Office Visit    1 month ago Routine general medical examination at a health care facility    [address] Dragan Barboza MD    Office Visit    11 months ago Psoriatic arthritis ([facility])    [facility], [facility] Benigno Martinez MD    Office Visit    1 year ago Psoriatic arthritis ([facility])    [facility], [facility] Benigno Martinez MD    Office Visit    1 year ago Routine general medical examination at a health care facility    [address] Dragan Barboza MD    Office Visit          Future Appointments         Provider Department Appt Notes    In 2 months Dragan Barboza MD [address] follow up appt    In 4 months Benigno Martinez MD [facility], [facility] fu 4 mo    In 5 months Dragan Barboza MD [address] Follow DM                    Passed - Microalbumin procedure in past 12 months or taking ACE/ARB        Passed - EGFRCR or GFRNAA > 50     GFR Evaluation  EGFRCR: 79 , resulted on 10/10/2024          Passed - GFR in the past 12 months           Recent Outpatient  Visits              6 days ago Psoriatic arthritis (HCC)    [facility], [facility] Benigno Martinez MD    Office Visit    1 month ago Routine general medical examination at a health care facility    [address] Dragan Barboza MD    Office Visit    11 months ago Psoriatic arthritis (HCC)    [facility] Benigno [facility] MD    Office Visit    1 year ago Psoriatic arthritis ([facility])    [facility] Benigno Martinez MD    Office Visit    1 year ago Routine general medical examination at a health care facility    [address] Dragan Barboza MD    Office Visit          Future Appointments         Provider Department Appt Notes    In 2 months Dragan Barboza MD [address] follow up appt    In 4 months Benigno Martinez MD [facility] fu 4 mo    In 5 months Dragan Barboza MD [address] Follow DM

## 2024-11-29 NOTE — TELEPHONE ENCOUNTER
Patient Comment: Can we please increase the quantity on this and refill?  Thank You.     Medication Quantity Refills Start End   pravastatin 10 MG Oral Tab 30 tablet 0 11/7/2024 --   Sig:   Take 1 tablet (10 mg total) by mouth nightly.     Route:   Oral     Order #:   [id number]     Written for 30 and 0 refills is refill appropriate Medication refill pended for your review/approval       Dragan Barboza MD to [facility] Clinical Staff       11/5/24  4:57 PM  Note     Pravastatin 10 mg nightly has been ordered as an alternative. This may be better tolerated. Agree with discontinuing rosuvastatin. He may also supplement with Co-Q10, which may facilitate improvement in symptoms.

## 2024-11-30 RX ORDER — PRAVASTATIN SODIUM 10 MG
10 TABLET ORAL NIGHTLY
Qty: 90 TABLET | Refills: 3 | Status: SHIPPED | OUTPATIENT
Start: 2024-11-30

## 2024-12-10 ENCOUNTER — NURSE TRIAGE (OUTPATIENT)
Dept: INTERNAL MEDICINE CLINIC | Facility: CLINIC | Age: 64
End: 2024-12-10

## 2024-12-10 NOTE — TELEPHONE ENCOUNTER
Action Requested: Summary for Provider     []  Critical Lab, Recommendations Needed  [] Need Additional Advice  []   FYI    []   Need Orders  [] Need Medications Sent to Pharmacy  []  Other     SUMMARY: Scheduled OV tomorrow. IC/ER warnings given   Future Appointments   Date Time Provider Department Center   12/11/2024  8:20 AM Roxy Sweet APRN [facility]   1/23/2025  2:40 PM Dragan Barboza MD [facility]   4/1/2025 11:20 AM Benigno Martinez MD [id number]   4/10/2025  8:00 AM Dragan Barboza MD [facility]     Reason for call: Sinus Problem  Onset: 2-3 days ago   Reason for Disposition   Using nasal washes and pain medicine > 24 hours and sinus pain (lower forehead, cheekbone, or eye) persists    Protocols used: Sinus Pain and Congestion-A-OH    C/o sinus pressure and pain, HA, post nasal drip, cough  OTC meds not helping. Tried Mucinex, Sudafed, Benadryl, Advil, saline nasal spray   Denies fever   Unable to sleep due to coughing   Covid negative   Stated he gets annual sinus infections. On Humira  Scheduled OV tomorrow. Provided IC/ER warnings. Pt verbalizes understanding and is agreeable to plan.

## 2024-12-11 ENCOUNTER — OFFICE VISIT (OUTPATIENT)
Dept: INTERNAL MEDICINE CLINIC | Facility: CLINIC | Age: 64
End: 2024-12-11
Payer: COMMERCIAL

## 2024-12-11 VITALS
TEMPERATURE: 98 F | HEART RATE: 70 BPM | SYSTOLIC BLOOD PRESSURE: 128 MMHG | WEIGHT: 186 LBS | HEIGHT: 68 IN | OXYGEN SATURATION: 98 % | DIASTOLIC BLOOD PRESSURE: 70 MMHG | RESPIRATION RATE: 18 BRPM | BODY MASS INDEX: 28.19 KG/M2

## 2024-12-11 DIAGNOSIS — J01.00 ACUTE NON-RECURRENT MAXILLARY SINUSITIS: Primary | ICD-10-CM

## 2024-12-11 DIAGNOSIS — R05.1 ACUTE COUGH: ICD-10-CM

## 2024-12-11 PROCEDURE — 3008F BODY MASS INDEX DOCD: CPT

## 2024-12-11 PROCEDURE — 3061F NEG MICROALBUMINURIA REV: CPT

## 2024-12-11 PROCEDURE — 3060F POS MICROALBUMINURIA REV: CPT

## 2024-12-11 PROCEDURE — 3052F HG A1C>EQUAL 8.0%<EQUAL 9.0%: CPT

## 2024-12-11 PROCEDURE — 99214 OFFICE O/P EST MOD 30 MIN: CPT

## 2024-12-11 PROCEDURE — 3074F SYST BP LT 130 MM HG: CPT

## 2024-12-11 PROCEDURE — 3078F DIAST BP <80 MM HG: CPT

## 2024-12-11 RX ORDER — BENZONATATE 100 MG/1
100 CAPSULE ORAL 3 TIMES DAILY PRN
Qty: 21 CAPSULE | Refills: 0 | Status: SHIPPED | OUTPATIENT
Start: 2024-12-11 | End: 2024-12-18

## 2024-12-11 RX ORDER — CODEINE PHOSPHATE AND GUAIFENESIN 10; 100 MG/5ML; MG/5ML
5 SOLUTION ORAL NIGHTLY PRN
Qty: 120 ML | Refills: 0 | Status: SHIPPED | OUTPATIENT
Start: 2024-12-11

## 2024-12-11 NOTE — PROGRESS NOTES
Jared Gotti is a 64 year old male.   Chief Complaint   Patient presents with    Cough     EJ RM 16a- Pt is here for a cough here for started 2 days ago, pt tested for covid it was negative    Sore Throat     Here for a sore throat it started 3 days    Sinus Problem     Here for sinus pressure     HPI:    Patient here today for one week of sinus pain, pressure, and sinus drainage. Patient reports persistent cough when talking or taking deep breath. No chest pain or shortness of breath. +fatigue. He is afebrile. On Humira for psoriatic arthritis followed by Rheumatology. Patient tolerating PO. No GI upset. Sleep impacted by cough which increases when he lays down. Covid testing at home negative.   Allergies:  Allergies[1]   Current Meds:  Current Outpatient Medications   Medication Sig Dispense Refill    pravastatin 10 MG Oral Tab Take 1 tablet (10 mg total) by mouth nightly. 90 tablet 3    metFORMIN  MG Oral Tablet 24 Hr Take 2 tablets (1,000 mg total) by mouth 2 (two) times daily with meals. 360 tablet 0    Adalimumab (HUMIRA, 2 PEN,) 40 MG/0.4ML Subcutaneous Auto-injector Kit Inject 40 mg into the skin every 14 (fourteen) days. 2 each 5    methotrexate 2.5 MG Oral Tab Take 3 tablets (7.5 mg total) by mouth once a week. 36 tablet 3    lisinopril 2.5 MG Oral Tab Take 1 tablet (2.5 mg total) by mouth daily. 90 tablet 3    ibuprofen 800 MG Oral Tab       folic acid 1 MG Oral Tab Take 1 tablet (1 mg total) by mouth daily. 90 tablet 3    Multiple Vitamins-Minerals (MULTIVITAL) Oral Chew Tab Chew 1 tablet by mouth daily.          PMH:     Past Medical History:    Benign non-nodular prostatic hyperplasia without lower urinary tract symptoms    Diabetes type 2, controlled ([facility])    cured 11.2015    Obesity (BMI 30-39.9)    Psoriatic arthritis ([facility])       ROS:   Review of Systems   Constitutional:  Positive for activity change and fatigue. Negative for appetite change, chills and fever.   HENT:  Positive for congestion,  postnasal drip, sinus pressure, sinus pain and voice change. Negative for ear discharge and ear pain.    Respiratory:  Positive for cough. Negative for chest tightness and wheezing.    Cardiovascular: Negative.    Gastrointestinal: Negative.    Neurological: Negative.             PHYSICAL EXAM:    /70   Pulse 70   Temp 97.7 °F (36.5 °C) (Temporal)   Resp 18   Ht 5' 8\" (1.727 m)   Wt 186 lb (84.4 kg)   SpO2 98%   BMI 28.28 kg/m²   Physical Exam  Constitutional:       Appearance: Normal appearance.   HENT:      Right Ear: Tympanic membrane normal.      Left Ear: Tympanic membrane normal.      Nose: Congestion present.      Mouth/Throat:      Mouth: Mucous membranes are moist.      Pharynx: Posterior oropharyngeal erythema present.   Pulmonary:      Effort: Pulmonary effort is normal.      Breath sounds: Normal breath sounds. No wheezing.   Skin:     General: Skin is warm and dry.   Neurological:      Mental Status: He is alert.        ASSESSMENT/ PLAN:   1. Acute non-recurrent maxillary sinusitis  Discussed home supportive remedies. Discussed need to hold Humira if starting antibiotic and through duration of medication. Discussed to support symptoms next 48 hours and if symptoms fail to improve to start Augmentin.   - benzonatate 100 MG Oral Cap; Take 1 capsule (100 mg total) by mouth 3 (three) times daily as needed for cough.  Dispense: 21 capsule; Refill: 0  - guaiFENesin-codeine 100-10 MG/5ML Oral Solution; Take 5 mL by mouth nightly as needed for cough.  Dispense: 120 mL; Refill: 0  - amoxicillin clavulanate 875-125 MG Oral Tab; Take 1 tablet by mouth 2 (two) times daily for 10 days.  Dispense: 20 tablet; Refill: 0    2. Acute cough  - benzonatate 100 MG Oral Cap; Take 1 capsule (100 mg total) by mouth 3 (three) times daily as needed for cough.  Dispense: 21 capsule; Refill: 0  - guaiFENesin-codeine 100-10 MG/5ML Oral Solution; Take 5 mL by mouth nightly as needed for cough.  Dispense: 120 mL; Refill:  0      Health Maintenance Due   Topic Date Due    TB Screen  11/05/2019    Diabetes Care A1C  01/10/2025     Pt indicates understanding and agrees to the plan.     Follow up as needed     MIKHAIL Schwartz          [1]   Allergies  Allergen Reactions    Celebrex [Celecoxib] OTHER (SEE COMMENTS)     Leg pain

## 2024-12-30 ENCOUNTER — TELEPHONE (OUTPATIENT)
Dept: INTERNAL MEDICINE CLINIC | Facility: CLINIC | Age: 64
End: 2024-12-30

## 2024-12-30 RX ORDER — AZITHROMYCIN 250 MG/1
TABLET, FILM COATED ORAL
Qty: 6 TABLET | Refills: 0 | Status: SHIPPED | OUTPATIENT
Start: 2024-12-30 | End: 2025-01-04

## 2024-12-30 NOTE — TELEPHONE ENCOUNTER
Patient called in stating he has finished his antibiotic and he continues to have a fever and cough, he is asking if another round of antibiotics could be called in.   States his fever gets up to 102, he is taking tylenol and that helps. He is taking dayquill as well.   LOV 12/11  1. Acute non-recurrent maxillary sinusitis  Discussed home supportive remedies. Discussed need to hold Humira if starting antibiotic and through duration of medication. Discussed to support symptoms next 48 hours and if symptoms fail to improve to start Augmentin.   - benzonatate 100 MG Oral Cap; Take 1 capsule (100 mg total) by mouth 3 (three) times daily as needed for cough.  Dispense: 21 capsule; Refill: 0  - guaiFENesin-codeine 100-10 MG/5ML Oral Solution; Take 5 mL by mouth nightly as needed for cough.  Dispense: 120 mL; Refill: 0  - amoxicillin clavulanate 875-125 MG Oral Tab; Take 1 tablet by mouth 2 (two) times daily for 10 days.  Dispense: 20 tablet; Refill: 0     2. Acute cough  - benzonatate 100 MG Oral Cap; Take 1 capsule (100 mg total) by mouth 3 (three) times daily as needed for cough.  Dispense: 21 capsule; Refill: 0  - guaiFENesin-codeine 100-10 MG/5ML Oral Solution; Take 5 mL by mouth nightly as needed for cough.  Dispense: 120 mL; Refill: 0

## 2025-01-07 RX ORDER — METFORMIN HYDROCHLORIDE 500 MG/1
1000 TABLET, EXTENDED RELEASE ORAL 2 TIMES DAILY WITH MEALS
Qty: 360 TABLET | Refills: 3 | Status: SHIPPED | OUTPATIENT
Start: 2025-01-07

## 2025-01-07 NOTE — TELEPHONE ENCOUNTER
Please Review. Protocol Failed; No Protocol   Lab Results   Component Value Date     A1C 8.7 (H) 10/10/2024     Requested Prescriptions   Pending Prescriptions Disp Refills    METFORMIN  MG Oral Tablet 24 Hr [Pharmacy Med Name: Metformin Hydrochloride Er 24hr 500 Mg Tab Gran] 240 tablet 0     Sig: TAKE 2 TABLETS BY MOUTH TWICE DAILY WITH MEALS       Diabetes Medication Protocol Failed - 1/7/2025 10:30 AM        Failed - Last A1C < 7.5 and within past 6 months     Lab Results   Component Value Date    A1C 8.7 (H) 10/10/2024             Passed - In person appointment or virtual visit in the past 6 mos or appointment in next 3 mos     Recent Outpatient Visits              3 weeks ago Acute non-recurrent maxillary sinusitis    [address] Roxy Sweet APRN    Office Visit    2 months ago Psoriatic arthritis ([facility])    [facility] Benigno Martinez MD    Office Visit    2 months ago Routine general medical examination at a health care facility    [address] Dragan Barboza MD    Office Visit    1 year ago Psoriatic arthritis ([facility])    [facility] Benigno Martinez MD    Office Visit    1 year ago Psoriatic arthritis ([facility])    [facility] Benigno Martinez MD    Office Visit          Future Appointments         Provider Department Appt Notes    In 2 weeks Dragan Barboza MD [address] follow up appt    In 2 months Benigno Martinez MD [facility] fu 4 mo    In 3 months Dragan Barboza MD [address] Follow DM                    Passed - Microalbumin procedure in past 12 months or taking ACE/ARB        Passed - EGFRCR or GFRNAA > 50     GFR Evaluation  EGFRCR: 79 , resulted on  10/10/2024          Passed - GFR in the past 12 months               Future Appointments         Provider Department Appt Notes    In 2 weeks Dragan Barboza MD [address] follow up appt    In 2 months Benigno Martinez MD [facility] fu 4 mo    In 3 months Dragan Barboza MD [address] Follow DM          Recent Outpatient Visits              3 weeks ago Acute non-recurrent maxillary sinusitis    [address] Roxy Sweet APRN    Office Visit    2 months ago Psoriatic arthritis (HCC)    [facility] Benigno Martinez MD    Office Visit    2 months ago Routine general medical examination at a health care facility    [address] Dragan Barboza MD    Office Visit    1 year ago Psoriatic arthritis (HCC)    [facility] Benigno Martinez MD    Office Visit    1 year ago Psoriatic arthritis (HCC)    [facility] Benigno Martienz MD    Office Visit

## 2025-01-17 LAB
ALBUMIN/GLOBULIN RATIO: 1.7 (CALC) (ref 1–2.5)
ALBUMIN: 4.2 G/DL (ref 3.6–5.1)
ALKALINE PHOSPHATASE: 35 U/L (ref 35–144)
ALT: 18 U/L (ref 9–46)
AST: 16 U/L (ref 10–35)
BILIRUBIN, TOTAL: 1 MG/DL (ref 0.2–1.2)
BUN: 13 MG/DL (ref 7–25)
CALCIUM: 9.1 MG/DL (ref 8.6–10.3)
CARBON DIOXIDE: 29 MMOL/L (ref 20–32)
CHLORIDE: 102 MMOL/L (ref 98–110)
CHOL/HDLC RATIO: 2.9 (CALC)
CHOLESTEROL, TOTAL: 131 MG/DL
CREATININE: 1.07 MG/DL (ref 0.7–1.35)
EGFR: 77 ML/MIN/1.73M2
GLOBULIN: 2.5 G/DL (CALC) (ref 1.9–3.7)
GLUCOSE: 95 MG/DL (ref 65–99)
HDL CHOLESTEROL: 45 MG/DL
HEMOGLOBIN A1C: 6 % OF TOTAL HGB
LDL-CHOLESTEROL: 71 MG/DL (CALC)
NON-HDL CHOLESTEROL: 86 MG/DL (CALC)
POTASSIUM: 4.5 MMOL/L (ref 3.5–5.3)
PROTEIN, TOTAL: 6.7 G/DL (ref 6.1–8.1)
SODIUM: 140 MMOL/L (ref 135–146)
TRIGLYCERIDES: 70 MG/DL

## 2025-01-23 ENCOUNTER — OFFICE VISIT (OUTPATIENT)
Dept: INTERNAL MEDICINE CLINIC | Facility: CLINIC | Age: 65
End: 2025-01-23
Payer: COMMERCIAL

## 2025-01-23 VITALS
OXYGEN SATURATION: 97 % | BODY MASS INDEX: 27.01 KG/M2 | WEIGHT: 178.19 LBS | DIASTOLIC BLOOD PRESSURE: 76 MMHG | HEIGHT: 68 IN | SYSTOLIC BLOOD PRESSURE: 126 MMHG | HEART RATE: 86 BPM

## 2025-01-23 DIAGNOSIS — E11.9 TYPE 2 DIABETES MELLITUS WITHOUT COMPLICATION, WITHOUT LONG-TERM CURRENT USE OF INSULIN (HCC): ICD-10-CM

## 2025-01-23 DIAGNOSIS — R05.2 SUBACUTE COUGH: Primary | ICD-10-CM

## 2025-01-23 DIAGNOSIS — E78.00 PURE HYPERCHOLESTEROLEMIA: ICD-10-CM

## 2025-01-23 PROCEDURE — G2211 COMPLEX E/M VISIT ADD ON: HCPCS | Performed by: INTERNAL MEDICINE

## 2025-01-23 PROCEDURE — 99214 OFFICE O/P EST MOD 30 MIN: CPT | Performed by: INTERNAL MEDICINE

## 2025-01-23 PROCEDURE — 3044F HG A1C LEVEL LT 7.0%: CPT | Performed by: INTERNAL MEDICINE

## 2025-01-23 PROCEDURE — 3078F DIAST BP <80 MM HG: CPT | Performed by: INTERNAL MEDICINE

## 2025-01-23 PROCEDURE — 3008F BODY MASS INDEX DOCD: CPT | Performed by: INTERNAL MEDICINE

## 2025-01-23 PROCEDURE — 3074F SYST BP LT 130 MM HG: CPT | Performed by: INTERNAL MEDICINE

## 2025-01-23 RX ORDER — PREDNISONE 20 MG/1
40 TABLET ORAL DAILY
Qty: 10 TABLET | Refills: 0 | Status: SHIPPED | OUTPATIENT
Start: 2025-01-23 | End: 2025-01-28

## 2025-01-23 RX ORDER — ALBUTEROL SULFATE 90 UG/1
2 INHALANT RESPIRATORY (INHALATION) EVERY 4 HOURS PRN
Qty: 1 EACH | Refills: 1 | Status: SHIPPED | OUTPATIENT
Start: 2025-01-23

## 2025-01-23 NOTE — PROGRESS NOTES
Jared Gotti  3/9/1960    Chief Complaint   Patient presents with    Follow - Up     Room [facility], [facility] [place], follow up visit, after been sick for 8 weeks pt still coughing.    Cough       SUBJECTIVE   Jarde Gotti is a 64 year old male who presents for evaluation.    Since last evaluation the patient has made significant improvements with dietary and lifestyle habits, achieving a near 20 pound weight loss over a 3-month duration, in addition to improved blood glucose control with hemoglobin A1c at 6.0% compared to 8.7% at that time.  He feels his current efforts are sustainable and plans to continue.  He also reports a now near 8-week duration of a cough with minimal production of sputum, however with some shortness of breath and wheezing.  No fevers or chills.  No significant mucus drainage.  No chest pain.    Review of Systems   No f/c/chest pain. No abd pain/n/v/d. No ha or dizziness. No numbness, tingling, or weakness. No other complaints today.    Current Outpatient Medications   Medication Sig Dispense Refill    hydrocortisone 2.5 % External Cream Apply to affected areas on face twice daily until resolved. Then switch to moisturizers.      triamcinolone 0.1 % External Cream apply to the affected area on the back twice daily. When improved switch to moisturizers. Avoid face, underarms, and groin      predniSONE 20 MG Oral Tab Take 2 tablets (40 mg total) by mouth daily for 5 days. 10 tablet 0    albuterol 108 (90 Base) MCG/ACT Inhalation Aero Soln Inhale 2 puffs into the lungs every 4 (four) hours as needed. 1 each 1    metFORMIN  MG Oral Tablet 24 Hr Take 2 tablets (1,000 mg total) by mouth 2 (two) times daily with meals. 360 tablet 3    guaiFENesin-codeine 100-10 MG/5ML Oral Solution Take 5 mL by mouth nightly as needed for cough. 120 mL 0    pravastatin 10 MG Oral Tab Take 1 tablet (10 mg total) by mouth nightly. 90 tablet 3    Adalimumab (HUMIRA, 2 PEN,) 40 MG/0.4ML Subcutaneous Auto-injector Kit Inject 40 mg into  the skin every 14 (fourteen) days. 2 each 5    methotrexate 2.5 MG Oral Tab Take 3 tablets (7.5 mg total) by mouth once a week. 36 tablet 3    lisinopril 2.5 MG Oral Tab Take 1 tablet (2.5 mg total) by mouth daily. 90 tablet 3    ibuprofen 800 MG Oral Tab       folic acid 1 MG Oral Tab Take 1 tablet (1 mg total) by mouth daily. 90 tablet 3    Multiple Vitamins-Minerals (MULTIVITAL) Oral Chew Tab Chew 1 tablet by mouth daily.        Allergies[1]   Past Medical History:    Benign non-nodular prostatic hyperplasia without lower urinary tract symptoms    Diabetes type 2, controlled ([facility])    cured 11.2015    Obesity (BMI 30-39.9)    Psoriatic arthritis ([facility])      Patient Active Problem List   Diagnosis    Psoriatic arthritis ([facility])    Benign non-nodular prostatic hyperplasia without lower urinary tract symptoms    Type 2 diabetes mellitus without complication, without long-term current use of insulin ([facility])    Psoriasis    Gilbert's syndrome    Pure hypercholesterolemia      History reviewed. No pertinent surgical history.   Social History     Socioeconomic History    Marital status:    Tobacco Use    Smoking status: Never     Passive exposure: Never    Smokeless tobacco: Never   Vaping Use    Vaping status: Never Used   Substance and Sexual Activity    Alcohol use: Yes    Drug use: No    Sexual activity: Not Currently         OBJECTIVE:   /76 (BP Location: Left arm, Patient Position: Sitting, Cuff Size: adult)   Pulse 86   Ht 5' 8\" (1.727 m)   Wt 178 lb 3.2 oz (80.8 kg)   SpO2 97%   BMI 27.10 kg/m²   Constitutional: Oriented to person, place, and time. No distress.   HEENT:  Normocephalic and atraumatic.  Cardiovascular: Normal rate, regular rhythm and intact distal pulses.  No murmur, rubs or gallops.   Pulmonary/Chest: No accessory muscle use of respiration while at rest.  Inspiratory wheezing of the right base greater than left base with coarse breath sounds throughout.  Abdominal: Soft. Bowel sounds  are normal. Non tender, no masses, no organomegaly or hernias.  Musculoskeletal: No edema  Skin: Skin is warm and dry. No rash.  Psychiatric: Normal mood and affect.     ASSESSMENT AND PLAN:   Jared Gotti is a 64 year old male who presents for evaluation.    Subacute cough:  Suspected postviral bronchitis  Prednisone 40 mg once daily x 5 days/doses ordered  Inhaled albuterol ordered  If symptoms do not improve by early next week, chest x-ray evaluation will be pursued    DM2:  Significantly improved with current HbA1c of 6.0%: Continue metformin extended release 1 g twice daily  Repeat laboratory evaluation in 3 months    Pure hypercholesterolemia:  LDL at 73  Continue pravastatin 10 mg daily; tolerating without myalgias    The patient indicates understanding of these issues and agrees to the plan.    TODAY'S ORDERS     No orders of the defined types were placed in this encounter.      Meds & Refills:  Requested Prescriptions     Signed Prescriptions Disp Refills    predniSONE 20 MG Oral Tab 10 tablet 0     Sig: Take 2 tablets (40 mg total) by mouth daily for 5 days.    albuterol 108 (90 Base) MCG/ACT Inhalation Aero Soln 1 each 1     Sig: Inhale 2 puffs into the lungs every 4 (four) hours as needed.       Imaging & Consults:  None    No follow-ups on file.  There are no Patient Instructions on file for this visit.    All questions were answered and the patient agrees with the plan.     Thank you,  Dragan Barboza MD       [1]   Allergies  Allergen Reactions    Celebrex [Celecoxib] OTHER (SEE COMMENTS)     Leg pain

## 2025-01-27 ENCOUNTER — HOSPITAL ENCOUNTER (OUTPATIENT)
Dept: GENERAL RADIOLOGY | Age: 65
Discharge: HOME | End: 2025-01-27
Attending: INTERNAL MEDICINE
Payer: COMMERCIAL

## 2025-01-27 ENCOUNTER — TELEPHONE (OUTPATIENT)
Dept: INTERNAL MEDICINE CLINIC | Facility: CLINIC | Age: 65
End: 2025-01-27

## 2025-01-27 DIAGNOSIS — R05.2 SUBACUTE COUGH: Primary | ICD-10-CM

## 2025-01-27 DIAGNOSIS — R05.2 SUBACUTE COUGH: ICD-10-CM

## 2025-01-27 PROCEDURE — 71046 X-RAY EXAM CHEST 2 VIEWS: CPT | Performed by: INTERNAL MEDICINE

## 2025-01-27 NOTE — TELEPHONE ENCOUNTER
Patient was seen 3 days ago:  Subacute cough:  Suspected postviral bronchitis  Prednisone 40 mg once daily x 5 days/doses ordered  Inhaled albuterol ordered  If symptoms do not improve by early next week, chest x-ray evaluation will be pursued    Patient reports feeling a little bit better, but still wheezing.  Chest xray order pended.

## 2025-03-31 LAB
ABSOLUTE BASOPHILS: 41 CELLS/UL (ref 0–200)
ABSOLUTE EOSINOPHILS: 219 CELLS/UL (ref 15–500)
ABSOLUTE LYMPHOCYTES: 2208 CELLS/UL (ref 850–3900)
ABSOLUTE MONOCYTES: 576 CELLS/UL (ref 200–950)
ABSOLUTE NEUTROPHILS: 2055 CELLS/UL (ref 1500–7800)
ALBUMIN/GLOBULIN RATIO: 1.8 (CALC) (ref 1–2.5)
ALBUMIN: 4.4 G/DL (ref 3.6–5.1)
ALKALINE PHOSPHATASE: 39 U/L (ref 35–144)
ALT: 15 U/L (ref 9–46)
AST: 15 U/L (ref 10–35)
BASOPHILS: 0.8 %
BILIRUBIN, TOTAL: 1.1 MG/DL (ref 0.2–1.2)
BUN: 17 MG/DL (ref 7–25)
C-REACTIVE PROTEIN: <3 MG/L
CALCIUM: 9.2 MG/DL (ref 8.6–10.3)
CARBON DIOXIDE: 26 MMOL/L (ref 20–32)
CHLORIDE: 100 MMOL/L (ref 98–110)
CREATININE: 1.08 MG/DL (ref 0.7–1.35)
EGFR: 75 ML/MIN/1.73M2
EOSINOPHILS: 4.3 %
GLOBULIN: 2.4 G/DL (CALC) (ref 1.9–3.7)
GLUCOSE: 116 MG/DL (ref 65–99)
HEMATOCRIT: 44.3 % (ref 38.5–50)
HEMOGLOBIN: 15.2 G/DL (ref 13.2–17.1)
LYMPHOCYTES: 43.3 %
MCH: 33.6 PG (ref 27–33)
MCHC: 34.3 G/DL (ref 32–36)
MCV: 98 FL (ref 80–100)
MONOCYTES: 11.3 %
MPV: 9.6 FL (ref 7.5–12.5)
NEUTROPHILS: 40.3 %
PLATELET COUNT: 164 THOUSAND/UL (ref 140–400)
POTASSIUM: 4.4 MMOL/L (ref 3.5–5.3)
PROTEIN, TOTAL: 6.8 G/DL (ref 6.1–8.1)
RDW: 12.8 % (ref 11–15)
RED BLOOD CELL COUNT: 4.52 MILLION/UL (ref 4.2–5.8)
SED RATE BY MODIFIED$WESTERGREN: 6 MM/H
SODIUM: 136 MMOL/L (ref 135–146)
WHITE BLOOD CELL COUNT: 5.1 THOUSAND/UL (ref 3.8–10.8)

## 2025-04-01 ENCOUNTER — OFFICE VISIT (OUTPATIENT)
Dept: RHEUMATOLOGY | Facility: CLINIC | Age: 65
End: 2025-04-01
Payer: COMMERCIAL

## 2025-04-01 VITALS
OXYGEN SATURATION: 98 % | SYSTOLIC BLOOD PRESSURE: 118 MMHG | TEMPERATURE: 98 F | WEIGHT: 185 LBS | HEART RATE: 74 BPM | DIASTOLIC BLOOD PRESSURE: 64 MMHG | HEIGHT: 68 IN | BODY MASS INDEX: 28.04 KG/M2 | RESPIRATION RATE: 16 BRPM

## 2025-04-01 DIAGNOSIS — E11.9 TYPE 2 DIABETES MELLITUS WITHOUT COMPLICATION, WITHOUT LONG-TERM CURRENT USE OF INSULIN (HCC): ICD-10-CM

## 2025-04-01 DIAGNOSIS — L40.50 PSORIATIC ARTHRITIS (HCC): Primary | ICD-10-CM

## 2025-04-01 DIAGNOSIS — L40.9 PSORIASIS: ICD-10-CM

## 2025-04-01 PROCEDURE — 3074F SYST BP LT 130 MM HG: CPT | Performed by: INTERNAL MEDICINE

## 2025-04-01 PROCEDURE — 99214 OFFICE O/P EST MOD 30 MIN: CPT | Performed by: INTERNAL MEDICINE

## 2025-04-01 PROCEDURE — 3078F DIAST BP <80 MM HG: CPT | Performed by: INTERNAL MEDICINE

## 2025-04-01 PROCEDURE — 3008F BODY MASS INDEX DOCD: CPT | Performed by: INTERNAL MEDICINE

## 2025-04-01 RX ORDER — MINOCYCLINE HYDROCHLORIDE 100 MG/1
CAPSULE ORAL
COMMUNITY
Start: 2025-03-12

## 2025-04-01 RX ORDER — CLOBETASOL PROPIONATE 0.5 MG/G
CREAM TOPICAL
COMMUNITY
Start: 2025-03-12

## 2025-04-01 NOTE — PROGRESS NOTES
[facility]  Dr. Martinez Progress Note     Subjective:   Jared Gotti is a(n) 65 year old male.   Current complaints:   Chief Complaint   Patient presents with    Psoriatic Arthritis     4 month f/u. Feeling pretty good. Normal joint pain. Skin is doing ok. Getting better this time of year.    History of psoriatic arthritis and psoriasis.  On Humira 40 mg every 2 weeks, methotrexate 7.5 mg/week, folic acid 1 mg/day.  Feeling reasonably well.   Joint pain not bad.    Walks for exercise.  Also does stationary bike.    Joint swelling  hands a bit.  AM stiffness no.  Skin - seeing a dermatologist has eczema-used cortisone cream and it was better.   Objective:   /64   Pulse 74   Temp 97.8 °F (36.6 °C)   Resp 16   Ht 5' 8\" (1.727 m)   Wt 185 lb (83.9 kg)   SpO2 98%   BMI 28.13 kg/m²   Lungs clear  Heart nsr   Hands non deformed   Elbows ok  Knees ok    Skin clear    3/28/2025 glucose 116 sodium 136 potassium 4.4 BUN 17 creatinine 1.08 calcium 9.2 GFR 75 AST 15 ALT 15 bilirubin 1.1 globulin 2.4 total protein 6.8 albumin 4.4 alkaline phos 39 C-reactive protein negative.  CBC stable white count 5.1 hemoglobin 15.2 hematocrit 44.3 platelet count 164,000.  Sed rate 6.  1/16/25  LDL  71  TJC  0  SJC  0  PtGA 2  MDGA  2  - RA CDAI  4  Assessment:     Encounter Diagnoses   Name Primary?    Psoriatic arthritis (HCC) Yes    Type 2 diabetes mellitus without complication, without long-term current use of insulin (HCC)     Psoriasis    Psoriatic arthritis stable.   Psoriasis stable.   Plan:     Patient Instructions   Continue Humira 40 mg every 2 weeks.  Your psoriatic arthritis is stable.  Methotrexate 7.5 mg per week.  Folic acid 1 mg per day.  Low fat diet.  Exercise by walking.   Return to office 4 months.          Benigno Martinez MD 4/1/2025 1:20 PM

## 2025-04-01 NOTE — PATIENT INSTRUCTIONS
Continue Humira 40 mg every 2 weeks.  Your psoriatic arthritis is stable.  Methotrexate 7.5 mg per week.  Folic acid 1 mg per day.  Low fat diet.  Exercise by walking.   Return to office 4 months.

## 2025-04-04 DIAGNOSIS — L40.50 PSORIATIC ARTHRITIS (HCC): ICD-10-CM

## 2025-04-04 RX ORDER — ADALIMUMAB 40MG/0.4ML
40 KIT SUBCUTANEOUS
Qty: 2 EACH | Refills: 5 | Status: SHIPPED | OUTPATIENT
Start: 2025-04-04

## 2025-04-04 NOTE — TELEPHONE ENCOUNTER
Humira 40 mg      Last office visit: 4/1/2025    Next Rheum Apt:9/9/2025 Benigno Martinez MD    Last fill: 11/5/2025 2 each, 5 refills    Labs:   Lab Results   Component Value Date    CREATSERUM 1.08 03/28/2025    GFR 70 07/22/2016    ALKPHO 39 03/28/2025    AST 15 03/28/2025    ALT 15 03/28/2025    BILT 1.1 03/28/2025    TP 6.8 03/28/2025    ALB 4.4 03/28/2025       Lab Results   Component Value Date    WBC 5.1 03/28/2025    HGB 15.2 03/28/2025     03/28/2025    NEPRELIM 2.99 07/22/2016    NEPERCENT 40.3 03/28/2025    LYPERCENT 43.3 03/28/2025    NE 2,055 03/28/2025    LYMABS 2,208 03/28/2025        Unable to assess due to medical condition

## 2025-04-10 ENCOUNTER — OFFICE VISIT (OUTPATIENT)
Dept: INTERNAL MEDICINE CLINIC | Facility: CLINIC | Age: 65
End: 2025-04-10
Payer: COMMERCIAL

## 2025-04-10 VITALS
WEIGHT: 185 LBS | BODY MASS INDEX: 28.04 KG/M2 | DIASTOLIC BLOOD PRESSURE: 70 MMHG | TEMPERATURE: 97 F | RESPIRATION RATE: 20 BRPM | HEIGHT: 68 IN | OXYGEN SATURATION: 99 % | SYSTOLIC BLOOD PRESSURE: 120 MMHG | HEART RATE: 73 BPM

## 2025-04-10 DIAGNOSIS — K21.9 GASTROESOPHAGEAL REFLUX DISEASE, UNSPECIFIED WHETHER ESOPHAGITIS PRESENT: ICD-10-CM

## 2025-04-10 DIAGNOSIS — R05.3 CHRONIC COUGH: ICD-10-CM

## 2025-04-10 DIAGNOSIS — R80.9 TYPE 2 DIABETES MELLITUS WITH DIABETIC MICROALBUMINURIA, WITHOUT LONG-TERM CURRENT USE OF INSULIN (HCC): Primary | ICD-10-CM

## 2025-04-10 DIAGNOSIS — E11.29 TYPE 2 DIABETES MELLITUS WITH DIABETIC MICROALBUMINURIA, WITHOUT LONG-TERM CURRENT USE OF INSULIN (HCC): Primary | ICD-10-CM

## 2025-04-10 PROCEDURE — 3074F SYST BP LT 130 MM HG: CPT | Performed by: INTERNAL MEDICINE

## 2025-04-10 PROCEDURE — 3008F BODY MASS INDEX DOCD: CPT | Performed by: INTERNAL MEDICINE

## 2025-04-10 PROCEDURE — G2211 COMPLEX E/M VISIT ADD ON: HCPCS | Performed by: INTERNAL MEDICINE

## 2025-04-10 PROCEDURE — 99214 OFFICE O/P EST MOD 30 MIN: CPT | Performed by: INTERNAL MEDICINE

## 2025-04-10 PROCEDURE — 3078F DIAST BP <80 MM HG: CPT | Performed by: INTERNAL MEDICINE

## 2025-04-10 RX ORDER — OMEPRAZOLE 40 MG/1
40 CAPSULE, DELAYED RELEASE ORAL DAILY
Qty: 90 CAPSULE | Refills: 3 | Status: SHIPPED | OUTPATIENT
Start: 2025-04-10 | End: 2026-04-05

## 2025-04-10 NOTE — PROGRESS NOTES
Jared Gotti  3/9/1960    Chief Complaint   Patient presents with    Follow - Up     Here for diabetes follow up        SUBJECTIVE   Jared Gotti is a 65 year old male who presents as a follow-up.    Since last evaluation the patient has overall maintained favorable dietary and lifestyle habits, achieving a fasting blood glucose level consistently near 100.  He has maintained compliance with metformin 1 g twice daily.  Symptoms of chronic cough, the onset of which was following a respiratory infection several months ago managed with oral steroid therapy, have since persisted.  His symptoms are associated with some classic symptoms of heartburn and do respond to some degree to use of H2 blocking therapy on a daily basis.  No shortness of breath, however there is some intermittent wheezing reported.  Inhaled Kaylin therapy did not offer any improvement with symptoms.  His symptoms are most consistently triggered by consumption of foods.    Review of Systems   No f/c/chest pain or sob. No abd pain/n/v/d. No ha or dizziness. No numbness, tingling, or weakness. No other complaints today.    Current Medications[1]   Allergies[2]   Past Medical History[3]   Problem List[4]   Past Surgical History[5]   Short Social Hx on File[6]      OBJECTIVE:   /70   Pulse 73   Temp 97.2 °F (36.2 °C) (Temporal)   Resp 20   Ht 5' 8\" (1.727 m)   Wt 185 lb (83.9 kg)   SpO2 99%   BMI 28.13 kg/m²   Constitutional: Oriented to person, place, and time. No distress.   HEENT:  Normocephalic and atraumatic.   Eyes: Conjunctivae wnl.   Cardiovascular: Normal rate, regular rhythm and intact distal pulses.  No murmur, rubs or gallops.   Pulmonary/Chest: Effort normal and breath sounds normal. No respiratory distress.  Abdominal: Soft. Bowel sounds are normal. Non tender, no masses, no organomegaly or hernias.  Musculoskeletal: No edema  Lymphadenopathy: No cervical adenopathy.   Neurological: No defecits  Skin: Skin is warm and dry. No  rash.  Psychiatric: Normal mood and affect.   Bilateral barefoot skin diabetic exam is normal, visualized feet and the appearance is normal.  Bilateral monofilament/sensation of both feet is normal.  Pulsation pedal pulse exam of both lower legs/feet is normal as well.  ASSESSMENT AND PLAN:   Jared Gotti is a 65 year old male who presents as a follow-up.    Outstanding screening and preventive measures:  None    Diabetes mellitus type 2:  Controlled with most recent hemoglobin A1c of 6.0%.  Repeat laboratory evaluation eligible for collection in 1 week.  At that time we will determine to either initiate metformin 1 g twice daily or extended release 750 mg twice daily, rather than taking 4 individual tablets per day.  Normal foot exam  Complications: Microalbuminuria: Continue lisinopril 2.5 mg daily.  Repeat study ordered.    Chronic cough:  Secondary to GERD.  Discontinue famotidine 40 mg daily.  Initiate omeprazole 40 mg daily.    Unrevealing chest x-ray while symptomatic in January    The patient indicates understanding of these issues and agrees to the plan.  TODAY'S ORDERS     No orders of the defined types were placed in this encounter.      Meds & Refills:  Requested Prescriptions     Signed Prescriptions Disp Refills    Omeprazole 40 MG Oral Capsule Delayed Release 90 capsule 3     Sig: Take 1 capsule (40 mg total) by mouth daily.       Imaging & Consults:  None    No follow-ups on file.  There are no Patient Instructions on file for this visit.    All questions were answered and the patient agrees with the plan.     Thank you,  Dragan Barboza MD       [1]   Current Outpatient Medications   Medication Sig Dispense Refill    Omeprazole 40 MG Oral Capsule Delayed Release Take 1 capsule (40 mg total) by mouth daily. 90 capsule 3    HUMIRA, 2 PEN, 40 MG/0.4ML Subcutaneous Auto-injector Kit INJECT 40 MG (0.4 ML) UNDER THE SKIN EVERY 14 DAYS 2 each 5    clobetasol 0.05 % External Cream APPLY TO AFFECTED AREAS ON  HANDS 2 TIMES DAILY WHEN FLARING. AVOID FACE, UNDERARMS, OR GROIN      hydrocortisone 2.5 % External Cream Apply to affected areas on face twice daily until resolved. Then switch to moisturizers.      triamcinolone 0.1 % External Cream apply to the affected area on the back twice daily. When improved switch to moisturizers. Avoid face, underarms, and groin      metFORMIN  MG Oral Tablet 24 Hr Take 2 tablets (1,000 mg total) by mouth 2 (two) times daily with meals. 360 tablet 3    pravastatin 10 MG Oral Tab Take 1 tablet (10 mg total) by mouth nightly. 90 tablet 3    methotrexate 2.5 MG Oral Tab Take 3 tablets (7.5 mg total) by mouth once a week. 36 tablet 3    lisinopril 2.5 MG Oral Tab Take 1 tablet (2.5 mg total) by mouth daily. 90 tablet 3    ibuprofen 800 MG Oral Tab       folic acid 1 MG Oral Tab Take 1 tablet (1 mg total) by mouth daily. 90 tablet 3    Multiple Vitamins-Minerals (MULTIVITAL) Oral Chew Tab Chew 1 tablet by mouth daily.     [2]   Allergies  Allergen Reactions    Celebrex [Celecoxib] OTHER (SEE COMMENTS)     Leg pain   [3]   Past Medical History:   Arthritis    Benign non-nodular prostatic hyperplasia without lower urinary tract symptoms    Diabetes type 2, controlled (HCC)    cured 11.2015    Food intolerance    Obesity (BMI 30-39.9)    Psoriatic arthritis (HCC)    Wears glasses   [4]   Patient Active Problem List  Diagnosis    Psoriatic arthritis (HCC)    Benign non-nodular prostatic hyperplasia without lower urinary tract symptoms    Type 2 diabetes mellitus without complication, without long-term current use of insulin (HCC)    Psoriasis    Gilbert's syndrome    Pure hypercholesterolemia   [5] History reviewed. No pertinent surgical history.  [6]   Social History  Socioeconomic History    Marital status:    Tobacco Use    Smoking status: Never     Passive exposure: Never    Smokeless tobacco: Never   Vaping Use    Vaping status: Never Used   Substance and Sexual Activity     Alcohol use: Yes    Drug use: No    Sexual activity: Not Currently

## 2025-04-24 RX ORDER — FAMOTIDINE 40 MG/1
40 TABLET, FILM COATED ORAL DAILY
Qty: 90 TABLET | Refills: 0 | OUTPATIENT
Start: 2025-04-24

## 2025-04-24 NOTE — TELEPHONE ENCOUNTER
Per 4/10/25 office visit with Dr. Barboza:  Discontinue famotidine 40 mg daily. Initiate omeprazole 40 mg daily.

## 2025-04-28 ENCOUNTER — NURSE TRIAGE (OUTPATIENT)
Dept: INTERNAL MEDICINE CLINIC | Facility: CLINIC | Age: 65
End: 2025-04-28

## 2025-04-28 NOTE — TELEPHONE ENCOUNTER
Called patient with provider response. He confirms understanding to hold statin. Appreciates quick response.

## 2025-04-28 NOTE — TELEPHONE ENCOUNTER
Action Requested: Summary for Provider     []  Critical Lab, Recommendations Needed  [x] Need Additional Advice  []   FYI    []   Need Orders  [] Need Medications Sent to Pharmacy  []  Other     SUMMARY: Patient called to request Paxlovid for COVID. States he took an at home COVID test and it was positive. +fever, runny nose and body aches. Denies cough, no shortness of breath. Advised on Home care treatment, quarantine recommendations given based on CDC. UC/ED precautions also given should he become short of breath.    Dr. Barboza - patient requesting Paxlovid for +COVID, symptoms started yesterday          Reason for call: Covid  Onset: 4/27/25      Reason for Disposition   COVID-19 diagnosed by positive lab test (e.g., PCR, rapid self-test kit) and NO symptoms (e.g., cough, fever, others)   COVID-19 infection suspected by caller or triager and mild symptoms (cough, fever, or others) and negative COVID-19 rapid test    Protocols used: Coronavirus (COVID-19) Diagnosed or [id number]

## 2025-05-20 ENCOUNTER — TELEPHONE (OUTPATIENT)
Dept: INTERNAL MEDICINE CLINIC | Facility: CLINIC | Age: 65
End: 2025-05-20

## 2025-05-20 DIAGNOSIS — R05.3 CHRONIC COUGH: Primary | ICD-10-CM

## 2025-05-20 NOTE — TELEPHONE ENCOUNTER
Called patient and left a message at 1750, however custodial through phone disconnected.  I am not sure if the patient got the message.  Please call him and let him know that I have ordered a CTA of the chest given the persistence of symptoms.  Increase omeprazole for 3 days from 40 mg once daily to 40 mg twice daily.  Advised him to ensure proper technique with using albuterol, as the wheezing suggests narrowing somewhere and in the case of an adult onset of asthma or even a wheezing secondary to GERD associated bronchospasm, should respond to albuterol to some degree.  He may either keep the appointment on 5/22, or cancel and await CT chest findings.  I will leave that up to him.

## 2025-05-20 NOTE — TELEPHONE ENCOUNTER
- cough and wheezing have gotten worse  - no other cold symptoms, no fever  - occasional shortness of breath when coughing  - no shortness of breath when not coughing  - Omeprazole has helped  - inhaler did not work for him  - he has appointment scheduled for 5/22/25  - pt not able to come in sooner    Routing to Dr. Barboza to update

## 2025-05-21 RX ORDER — ALBUTEROL SULFATE 90 UG/1
2 INHALANT RESPIRATORY (INHALATION) EVERY 4 HOURS PRN
Qty: 1 EACH | Refills: 0 | Status: SHIPPED | OUTPATIENT
Start: 2025-05-21

## 2025-05-21 NOTE — TELEPHONE ENCOUNTER
Called and spoke to pt. Relayed Dr. Barboza's message below. Pt verbalized understanding and is agreeable to plan. He opted to cancel appointment and wait until he has results of CT. He states he is out of Albuterol and needs new prescription sent. New prescription pended    Dr. Barboza - is pt to continue using albuterol as it was previously prescribed?

## 2025-05-29 ENCOUNTER — HOSPITAL ENCOUNTER (OUTPATIENT)
Dept: CT IMAGING | Age: 65
Discharge: HOME | End: 2025-05-29
Attending: INTERNAL MEDICINE
Payer: COMMERCIAL

## 2025-05-29 ENCOUNTER — TELEPHONE (OUTPATIENT)
Dept: INTERNAL MEDICINE CLINIC | Facility: CLINIC | Age: 65
End: 2025-05-29

## 2025-05-29 DIAGNOSIS — R05.3 CHRONIC COUGH: ICD-10-CM

## 2025-05-29 PROCEDURE — 71250 CT THORAX DX C-: CPT | Performed by: INTERNAL MEDICINE

## 2025-05-29 NOTE — TELEPHONE ENCOUNTER
Patient called stating he has completed his CT scan and is asking if he needs to have a follow up appointment?    Advised provider has not reviewed CT scan, patient verbalizes understanding    Impression   CONCLUSION:       1. Noncalcified pulmonary nodules in the lungs measuring up to 4 mm as above. Followup as per Fleischner criteria is suggested.     2. There is ectasia of the ascending aorta measuring up to 4.1 cm in diameter which could be further assessed with gated CTA of the thoracic aorta as clinically directed.       Please see above for further details.     The findings include multiple, incidentally detected, solid pulmonary nodules, measuring less than 6mm.  2017 guidelines from the Fleischner Society for the follow-up and management of incidentally detected indeterminate pulmonary nodules in persons at  least 35 years of age depend on nodule size (average length and width) and underlying risk factors (including smoking and other risk factors). Please consider the following recommendations after clinical assessment of risk factors.  For <6mm solid  nodules: In low risk patients, no follow-up required.  If suspicious morphology or upper lobe location, consider 12 month follow-up.  In high risk patients, optional CT in 12 months.     LOV 4/10/25

## 2025-06-03 NOTE — IMAGING NOTE
0910- Pt called and instructed to arrive 15 minutes prior to CTA gated study on 6/6/25, park in the [facility] parking garage, eat a light breakfast/lunch, hydrate, hold caffeine/decaf/chocolate for 12 hours prior, and take all medications as prescribed.  Pt verbalized understanding and denies further questions.

## 2025-06-06 ENCOUNTER — HOSPITAL ENCOUNTER (OUTPATIENT)
Dept: CT IMAGING | Facility: HOSPITAL | Age: 65
Discharge: HOME | End: 2025-06-06
Attending: INTERNAL MEDICINE
Payer: COMMERCIAL

## 2025-06-06 VITALS — DIASTOLIC BLOOD PRESSURE: 68 MMHG | SYSTOLIC BLOOD PRESSURE: 120 MMHG | HEART RATE: 81 BPM

## 2025-06-06 DIAGNOSIS — I71.20 THORACIC AORTIC ANEURYSM WITHOUT RUPTURE, UNSPECIFIED PART: ICD-10-CM

## 2025-06-06 LAB
CREAT BLD-MCNC: 1.2 MG/DL (ref 0.7–1.3)
EGFRCR SERPLBLD CKD-EPI 2021: 66 ML/MIN/1.73M2 (ref 60–?)

## 2025-06-06 PROCEDURE — 71275 CT ANGIOGRAPHY CHEST: CPT | Performed by: INTERNAL MEDICINE

## 2025-06-06 PROCEDURE — 82565 ASSAY OF CREATININE: CPT

## 2025-06-06 RX ORDER — DILTIAZEM HYDROCHLORIDE 5 MG/ML
5 INJECTION INTRAVENOUS SEE ADMIN INSTRUCTIONS
Status: DISCONTINUED | OUTPATIENT
Start: 2025-06-06 | End: 2025-06-08

## 2025-06-06 RX ORDER — METOPROLOL TARTRATE 1 MG/ML
5 INJECTION, SOLUTION INTRAVENOUS SEE ADMIN INSTRUCTIONS
Status: DISCONTINUED | OUTPATIENT
Start: 2025-06-06 | End: 2025-06-08

## 2025-06-06 NOTE — IMAGING NOTE
Jared to CT Rm 4 for gated coronary study.     Pt denies use of long acting nitrates like, Imdur, Cialis, Levitra and Viagra.       Procedure explained and questions answered. Positioned pt on table. O2 applied via nasal cannula @ 2LPM. Vital signs monitored and noted in Flowsheet.    GFR = 66  Contrast injected followed by saline flush at 09:03  Contrast = 95 ml  0.9 NS flush = 100 ml  HR during scan = 88 BPM     Patient tolerated the procedure without complication. Denies any contrast reaction. Discontinued IV saline lock w/ coban drsg applied. Patient instructed to hydrate well for next 48 hrs to facilitate contrast elimination.     Jared escorted to dressing room and discharged in stable condition.

## 2025-06-15 NOTE — LETTER
..  Time: 1754    Old EKG Present:    [] Yes   [] No    Type:    [x] Standard Left   [] Right Sided   [] Posterior Sided   [] Rhythm Strip    Shown to: Dr Mahad Stacy     11/06/19        [address] [address]      Dear Blaine Ham,    Our records indicate that you have outstanding FASTING lab work and or testing that was ordered for you and has not yet been completed:  Orders Placed This Encount

## 2025-06-17 ENCOUNTER — HOSPITAL ENCOUNTER (OUTPATIENT)
Dept: CV DIAGNOSTICS | Age: 65
Discharge: HOME | End: 2025-06-17
Attending: INTERNAL MEDICINE
Payer: COMMERCIAL

## 2025-06-17 DIAGNOSIS — E11.29 TYPE 2 DIABETES MELLITUS WITH DIABETIC MICROALBUMINURIA, WITHOUT LONG-TERM CURRENT USE OF INSULIN (HCC): ICD-10-CM

## 2025-06-17 DIAGNOSIS — R93.1 ABNORMAL CT SCAN OF HEART: ICD-10-CM

## 2025-06-17 DIAGNOSIS — E78.00 PURE HYPERCHOLESTEROLEMIA: ICD-10-CM

## 2025-06-17 DIAGNOSIS — R80.9 TYPE 2 DIABETES MELLITUS WITH DIABETIC MICROALBUMINURIA, WITHOUT LONG-TERM CURRENT USE OF INSULIN (HCC): ICD-10-CM

## 2025-06-17 DIAGNOSIS — R93.1 AGATSTON CORONARY ARTERY CALCIUM SCORE BETWEEN 200 AND 399: ICD-10-CM

## 2025-06-17 PROCEDURE — 78452 HT MUSCLE IMAGE SPECT MULT: CPT | Performed by: INTERNAL MEDICINE

## 2025-06-17 PROCEDURE — 93018 CV STRESS TEST I&R ONLY: CPT | Performed by: INTERNAL MEDICINE

## 2025-06-17 PROCEDURE — 93017 CV STRESS TEST TRACING ONLY: CPT | Performed by: INTERNAL MEDICINE

## 2025-06-17 RX ORDER — REGADENOSON 0.08 MG/ML
INJECTION, SOLUTION INTRAVENOUS
Status: COMPLETED
Start: 2025-06-17 | End: 2025-06-17

## 2025-06-17 RX ADMIN — REGADENOSON 0.4 MG: 0.08 INJECTION, SOLUTION INTRAVENOUS at 08:30:00

## 2025-06-30 RX ORDER — OMEPRAZOLE 40 MG/1
40 CAPSULE, DELAYED RELEASE ORAL DAILY
Qty: 90 CAPSULE | Refills: 3 | Status: SHIPPED | OUTPATIENT
Start: 2025-06-30 | End: 2026-06-25

## 2025-07-21 RX ORDER — ALBUTEROL SULFATE 90 UG/1
2 INHALANT RESPIRATORY (INHALATION) EVERY 4 HOURS PRN
Qty: 1 EACH | Refills: 0 | Status: CANCELLED | OUTPATIENT
Start: 2025-07-21

## 2025-07-23 RX ORDER — ALBUTEROL SULFATE 90 UG/1
2 INHALANT RESPIRATORY (INHALATION) EVERY 4 HOURS PRN
Qty: 8.5 G | Refills: 0 | Status: SHIPPED | OUTPATIENT
Start: 2025-07-23

## (undated) DIAGNOSIS — Z12.5 PROSTATE CANCER SCREENING: ICD-10-CM

## (undated) DIAGNOSIS — Z12.11 SCREEN FOR COLON CANCER: Primary | ICD-10-CM

## (undated) NOTE — LETTER
05/10/21        Cleo Alatorre  [address] [place] [id number]      Dear Prema Wynne,    Our records indicate that you have outstanding lab work and or testing that was ordered for you and has not yet been completed:  Orders Placed This Encounter

## (undated) NOTE — MR AVS SNAPSHOT
After Visit Summary   9/23/2019    Gerardo Uriarte    MRN: [id number]           Visit Information     Date & Time  9/23/2019 34:22 AM Provider  Jewel Voss MD Department  [facility] Dept.  Phone  [phone] Were  Most and control of psoriasis. In addition take methotrexate 3 tablets/week and folic acid 1 mg/day. Use of ibuprofen or Tylenol is as needed. Continue to exercise regularly and watch her diet eating more fresh fruit and vegetables.   Return to see Dr. Samir Saldaña [address]  Monday - Friday  4:00 pm - 10:00 pm  Saturday - Sunday  10:00 am - 4:00 pm       Conditions needing urgent attention, but are not life-threatening.          Average cost  $120*       EMERGENCY ROOM         Life-threatening emergencies needing i